# Patient Record
Sex: FEMALE | Race: OTHER | HISPANIC OR LATINO | Employment: PART TIME | URBAN - METROPOLITAN AREA
[De-identification: names, ages, dates, MRNs, and addresses within clinical notes are randomized per-mention and may not be internally consistent; named-entity substitution may affect disease eponyms.]

---

## 2017-10-05 ENCOUNTER — CONVERSION ENCOUNTER (OUTPATIENT)
Dept: MAMMOGRAPHY | Facility: CLINIC | Age: 42
End: 2017-10-05

## 2018-06-02 LAB
ABSOL LYMPHOCYTES (HISTORICAL): 1.4 K/UL (ref 0.5–4)
BASOPHILS # BLD AUTO: 0 K/UL (ref 0–0.1)
BASOPHILS # BLD AUTO: 1 % (ref 0–1)
DEPRECATED RDW RBC AUTO: 17.2 %
EOSINOPHIL # BLD AUTO: 0.1 K/UL (ref 0–0.4)
EOSINOPHIL NFR BLD AUTO: 1 % (ref 0–6)
HCT VFR BLD AUTO: 41 % (ref 36–46)
HGB BLD-MCNC: 13.8 G/DL (ref 12–16)
LYMPHOCYTES NFR BLD AUTO: 19 % (ref 25–45)
MCH RBC QN AUTO: 29.6 PG (ref 26–34)
MCHC RBC AUTO-ENTMCNC: 33.7 % (ref 31–36)
MCV RBC AUTO: 88 FL (ref 80–100)
MONOCYTES # BLD AUTO: 0.4 K/UL (ref 0.2–0.9)
MONOCYTES NFR BLD AUTO: 6 % (ref 1–10)
NEUTROPHILS ABS COUNT (HISTORICAL): 5.6 K/UL (ref 1.8–7.8)
NEUTS SEG NFR BLD AUTO: 73 % (ref 45–65)
PLATELET # BLD AUTO: 247 K/MCL (ref 150–450)
RBC # BLD AUTO: 4.67 M/MCL (ref 4–5.2)
WBC # BLD AUTO: 7.6 K/MCL (ref 4.5–11)

## 2018-07-18 ENCOUNTER — TELEPHONE (OUTPATIENT)
Dept: FAMILY MEDICINE CLINIC | Facility: CLINIC | Age: 43
End: 2018-07-18

## 2018-08-09 ENCOUNTER — ANNUAL EXAM (OUTPATIENT)
Dept: OBGYN CLINIC | Facility: CLINIC | Age: 43
End: 2018-08-09
Payer: COMMERCIAL

## 2018-08-09 VITALS — SYSTOLIC BLOOD PRESSURE: 120 MMHG | DIASTOLIC BLOOD PRESSURE: 80 MMHG | WEIGHT: 148 LBS

## 2018-08-09 DIAGNOSIS — Z01.419 WOMEN'S ANNUAL ROUTINE GYNECOLOGICAL EXAMINATION: Primary | ICD-10-CM

## 2018-08-09 DIAGNOSIS — Z12.4 SCREENING FOR MALIGNANT NEOPLASM OF CERVIX: ICD-10-CM

## 2018-08-09 PROCEDURE — 99386 PREV VISIT NEW AGE 40-64: CPT | Performed by: NURSE PRACTITIONER

## 2018-08-09 RX ORDER — FERROUS FUMARATE 324(106)MG
324 TABLET ORAL
COMMUNITY
Start: 2018-03-05 | End: 2019-05-10 | Stop reason: SDUPTHER

## 2018-08-09 RX ORDER — RANITIDINE 150 MG/1
150 TABLET ORAL EVERY 12 HOURS
COMMUNITY
Start: 2018-03-13 | End: 2019-05-10 | Stop reason: SDUPTHER

## 2018-08-09 RX ORDER — ZOLPIDEM TARTRATE 10 MG/1
10 TABLET ORAL EVERY 24 HOURS
COMMUNITY
End: 2021-05-04 | Stop reason: ALTCHOICE

## 2018-08-09 NOTE — PROGRESS NOTES
ANNUAL GYNECOLOGICAL EXAMINATION    Kenyatta Brice is a 44 y o  female who presents today for annual GYN exam   Her last pap smear was performed 10/17/17 and result was NILM with neg HPV  She reports no history of abnormal pap smears in her past  Her last mammogram was performed 10/5/17 and result was WNL  She reports menses as regular  Patient's last menstrual period was 2018 (exact date)  Her contraceptive method is BTL  Her general medical history has been reviewed and she reports it as follows:    Past Medical History:   Diagnosis Date    Depression     well-managed with Zoloft     Past Surgical History:   Procedure Laterality Date    TUBAL LIGATION       OB History      Para Term  AB Living    3 3 3 0   3    SAB TAB Ectopic Multiple Live Births                     Social History   Substance Use Topics    Smoking status: Never Smoker    Smokeless tobacco: Never Used    Alcohol use No     Cancer-related family history is negative for Cancer and Breast cancer  Current Outpatient Prescriptions:     Ferrous Fumarate 324 (106 Fe) MG TABS, Take 324 mg by mouth every 12 hours for 3 doses only each week, Disp: , Rfl:     ranitidine (ZANTAC) 150 mg tablet, Take 150 mg by mouth Every 12 hours, Disp: , Rfl:     sertraline (ZOLOFT) 50 mg tablet, Take 50 mg by mouth every 24 hours, Disp: , Rfl:     zolpidem (AMBIEN) 10 mg tablet, Apply 10 mg to the mouth or throat every 24 hours, Disp: , Rfl:     Review of Systems:  Review of Systems   Constitutional: Negative  Gastrointestinal: Negative  Genitourinary: Negative for difficulty urinating, menstrual problem, pelvic pain and vaginal discharge  Skin: Negative  Physical Exam:  /80 (Patient Position: Sitting)   Wt 67 1 kg (148 lb)   LMP 2018 (Exact Date)   Physical Exam   Constitutional: She is oriented to person, place, and time  She appears well-developed and well-nourished     Genitourinary: Vagina normal and uterus normal  There is no lesion on the right labia  There is no lesion on the left labia  Vagina exhibits no lesion and no rugosity  No vaginal discharge found  Right adnexum does not display mass and does not display tenderness  Left adnexum does not display mass and does not display tenderness  Cervix does not exhibit motion tenderness, lesion or pinkness  Uterus is not tender  Neck: Neck supple  No thyromegaly present  Cardiovascular: Normal rate and regular rhythm  Pulmonary/Chest: Effort normal and breath sounds normal  Right breast exhibits no mass, no nipple discharge, no skin change and no tenderness  Left breast exhibits no mass, no nipple discharge, no skin change and no tenderness  Abdominal: Soft  Bowel sounds are normal    Neurological: She is alert and oriented to person, place, and time  Skin: Skin is warm and dry  Assessment:   1  Normal well-woman GYN exam     Plan:   1  Pap smear is current and not indicated at this time  2  Imaging ordered: bilateral screening mammogram to be done after 10/5/18  3  Return to office 1 year

## 2018-09-04 ENCOUNTER — HOSPITAL ENCOUNTER (EMERGENCY)
Facility: HOSPITAL | Age: 43
Discharge: HOME | End: 2018-09-04
Attending: EMERGENCY MEDICINE | Admitting: EMERGENCY MEDICINE
Payer: COMMERCIAL

## 2018-09-04 VITALS
DIASTOLIC BLOOD PRESSURE: 69 MMHG | SYSTOLIC BLOOD PRESSURE: 122 MMHG | TEMPERATURE: 97.6 F | HEART RATE: 82 BPM | RESPIRATION RATE: 16 BRPM | OXYGEN SATURATION: 99 %

## 2018-09-04 DIAGNOSIS — B00.9 HERPES INFECTION: Primary | ICD-10-CM

## 2018-09-04 PROCEDURE — 99282 EMERGENCY DEPT VISIT SF MDM: CPT

## 2018-09-04 RX ORDER — ACYCLOVIR 800 MG/1
800 TABLET ORAL
Qty: 35 TABLET | Refills: 0 | Status: SHIPPED | OUTPATIENT
Start: 2018-09-04 | End: 2020-01-27 | Stop reason: ALTCHOICE

## 2018-09-04 RX ORDER — ACYCLOVIR 50 MG/G
OINTMENT TOPICAL
Qty: 15 G | Refills: 0 | Status: SHIPPED | OUTPATIENT
Start: 2018-09-04 | End: 2019-05-10

## 2018-09-04 NOTE — ED PROVIDER NOTES
History  Chief Complaint   Patient presents with    Rash     tiburcio has rash on the L side of face X4 days  78-year-old female presents for evaluation of a rash over the left side of her cheek that started yesterday  Patient reports that it is itchy and painful to touch  Reports that she noticed it spread  States that she had this same exact rash 10 years ago and was told it was shingles  Patient denies any fever, chills, new stress like conditions  Reports that she has not taken anything for it  Prior to Admission Medications   Prescriptions Last Dose Informant Patient Reported? Taking? Ferrous Fumarate 324 (106 Fe) MG TABS   Yes No   Sig: Take 324 mg by mouth every 12 hours for 3 doses only each week   ranitidine (ZANTAC) 150 mg tablet   Yes No   Sig: Take 150 mg by mouth Every 12 hours   sertraline (ZOLOFT) 50 mg tablet   Yes No   Sig: Take 50 mg by mouth every 24 hours   zolpidem (AMBIEN) 10 mg tablet   Yes No   Sig: Apply 10 mg to the mouth or throat every 24 hours      Facility-Administered Medications: None       Past Medical History:   Diagnosis Date    Depression 2017    well-managed with Zoloft       Past Surgical History:   Procedure Laterality Date    TUBAL LIGATION  2009       Family History   Problem Relation Age of Onset    Cancer Neg Hx     Breast cancer Neg Hx      I have reviewed and agree with the history as documented  Social History   Substance Use Topics    Smoking status: Never Smoker    Smokeless tobacco: Never Used    Alcohol use No        Review of Systems   Constitutional: Negative for chills and fever  HENT: Negative for ear discharge, ear pain and facial swelling  Eyes: Negative for pain and redness  Gastrointestinal: Negative for nausea and vomiting  Musculoskeletal: Negative for myalgias, neck pain and neck stiffness  Skin: Positive for rash  Neurological: Negative for weakness and numbness         Physical Exam  Physical Exam Constitutional: She appears well-developed and well-nourished  No distress  HENT:   Head:       Cardiovascular: Normal rate and normal heart sounds  Pulmonary/Chest: Effort normal and breath sounds normal    Skin: Skin is warm  Capillary refill takes less than 2 seconds  Rash noted  She is not diaphoretic  Vitals reviewed  Vital Signs  ED Triage Vitals   Temperature Pulse Respirations Blood Pressure SpO2   09/04/18 1654 09/04/18 1655 09/04/18 1655 09/04/18 1655 09/04/18 1655   97 6 °F (36 4 °C) 82 16 122/69 99 %      Temp Source Heart Rate Source Patient Position - Orthostatic VS BP Location FiO2 (%)   09/04/18 1654 09/04/18 1655 09/04/18 1655 09/04/18 1655 --   Temporal Monitor Sitting Right arm       Pain Score       09/04/18 1655       6           Vitals:    09/04/18 1655   BP: 122/69   Pulse: 82   Patient Position - Orthostatic VS: Sitting       Visual Acuity      ED Medications  Medications - No data to display    Diagnostic Studies  Results Reviewed     None                 No orders to display              Procedures  Procedures       Phone Contacts  ED Phone Contact    ED Course                               MDM  Number of Diagnoses or Management Options  Herpes infection:   Diagnosis management comments: A 45-year-old female presents for evaluation of a rash over the left side of her face that started yesterday  Reports that it is itchy  Has not taken anything for it  Patient reports he had the same rash 10 years ago was [place] antiviral in a different country  Will treat this as herpes infection shingles vs HSV  Advised patient to follow up with family care provider in 1 week for recheck of symptoms      CritCare Time    Disposition  Final diagnoses:   Herpes infection     Time reflects when diagnosis was documented in both MDM as applicable and the Disposition within this note     Time User Action Codes Description Comment    9/4/2018  5:17 PM Rosmery Ryder Add [B00 9] Herpes infection ED Disposition     ED Disposition Condition Comment    Discharge  Berhane Galan discharge to home/self care  Condition at discharge: Stable        Follow-up Information     Follow up With Specialties Details Why Contact Rima Condon MD Family Medicine Schedule an appointment as soon as possible for a visit in 1 week Follow up for re-check of symptoms [address]  [address] [facility]  [address]             Discharge Medication List as of 9/4/2018  5:21 PM      START taking these medications    Details   acyclovir (ZOVIRAX) 5 % ointment Apply topically 6 (six) times a day for 7 days, Starting Tue 9/4/2018, Until Tue 9/11/2018, Print      acyclovir (ZOVIRAX) 800 mg tablet Take 1 tablet (800 mg total) by mouth 5 (five) times a day for 7 days, Starting Tue 9/4/2018, Until Tue 9/11/2018, Print         CONTINUE these medications which have NOT CHANGED    Details   Ferrous Fumarate 324 (106 Fe) MG TABS Take 324 mg by mouth every 12 hours for 3 doses only each week, Starting Mon 3/5/2018, Historical Med      ranitidine (ZANTAC) 150 mg tablet Take 150 mg by mouth Every 12 hours, Starting Tue 3/13/2018, Historical Med      sertraline (ZOLOFT) 50 mg tablet Take 50 mg by mouth every 24 hours, Historical Med      zolpidem (AMBIEN) 10 mg tablet Apply 10 mg to the mouth or throat every 24 hours, Historical Med           No discharge procedures on file      ED Provider  Electronically Signed by           Toan Baig PA-C  09/04/18 9531

## 2018-09-04 NOTE — DISCHARGE INSTRUCTIONS
Culebrilla   LO QUE NECESITA SABER:   La culebrilla es oli infección dolorosa causada por el mismo virus que causa la varicela (virus varicela-zóster)  Después de contagiarse con varicela, el virus permanece en khanna cuerpo por varios años sin causar ningún síntoma  La culebrilla ocurre cuando el virus se activa nuevamente  Bautista Fried que se activa, el virus viaja por un nervio hasta khanna piel y provoca un sarpullido  INSTRUCCIONES SOBRE EL SYLWIA HOSPITALARIA:   Busque atención médica de inmediato si:   · La piel alrededor de las ampollas está adolorida, enrojecida y caliente o las ampollas drenan pus  · Usted tiene rigidez en el liliana o dificultad para moverlo  · Usted tiene dificultad para  ned brazos, piernas o darnell  · Usted sufre oli convulsión  · Usted tiene debilidad en un brazo o en oli pierna  · Usted se siente confundido o tiene dificultad para hablar  · Usted se siente mareado, tiene dolor de [place] severo o pérdida de audición o visión  Pregúntele a khanna Marvie Backers vitaminas y minerales son adecuados para usted  · Usted se siente débil o tiene dolor de [place]  · Usted tiene tos, escalofríos o fiebre  · Usted tiene dolor abdominal o náuseas o está vomitando  · El sarpullido le causa más picazón o dolor  · El sarpullido se propaga a otras partes de khanna cuerpo  · Khanna dolor empeora y no desaparece aún después de kartik medicamento  · Usted tiene preguntas o inquietudes acerca de khanna condición o cuidado  Medicamentos:   · Medicamentos antivirales  ayudan a reducir los síntomas y el tiempo de recuperación  También podrían reducir khanna riesgo de dolor de [place]  Para prevenir el dolor del nervio, deberá comenzar a kartik huang medicamento dentro de los primeros yuan días del inicio de los síntomas      · Los analgésicos  pueden ser recetados o sugeridos por khanna médico  Dependiendo de la severidad de khanna dolor, usted podría necesitar medicamentos antiinflamatorios para el dolor, acetaminofén u opiáceos  No espere a que khanna dolor sea severo para kartik más medicamentos para el dolor  · Los anestésicos tópicos  se usan para entumecer la piel y reducir el dolor  Son disponibles en forma de crema, gel, aerosol o un parche  · Los anticonvulsivos  reducen el dolor de nervio y podrían facilitar khanna habilidad para dormir  · Antidepresivos  se usan para reducir el dolor de nervio  · [place] ned medicamentos yordy se le haya indicado  Consulte con khanna médico si usted amber que khanna medicamento no le está ayudando o si presenta efectos secundarios  Infórmele si es alérgico a cualquier medicamento  Mantenga oli lista actualizada de los [place], las vitaminas y los productos herbales que margret  Incluya los siguientes datos de los medicamentos: cantidad, frecuencia y motivo de administración  Traiga con usted la lista o los envases de la píldoras a ned citas de seguimiento  Lleve la lista de los medicamentos con usted en veronica de oli emergencia  Acuda a ned consultas de control con khanna médico según le indicaron  Anote ned preguntas para que se acuerde de hacerlas blake ned visitas  Cuidados personales:  Mantenga el sarpullido limpio y [address] sarpullido con un vendaje o ropa  No utilice vendajes que se pegan a la piel  La parte pegajosa podría irritar la piel y prolongar el sarpullido  Prevenga la propagación de la culebrilla:  Es posible transmitir el virus a oli persona que nunca ha tenido varicela  Oli vez expuesto al virus, esta persona puede contraer la varicela, yelena no culebrilla  Es posible transmitir el virus a otras personas mientras tiene el sarpullido  El virus se transmite por contacto directo con el líquido de las [place]  Por lo general, no es posible transmitir el virus oli vez que se sequen las ampollas  Prevenga la culebrilla u otro brote de culebrilla:  Es posible que se administre oli vacuna para facilitar la prevención de la culebrilla   Cally Perkins información acerca de esta vacuna  Para más información:   · Centers for Disease Control and Prevention  [address] , [address]  Phone: 0- 967 - [phone]  Phone: 1- 451 - 8437741  Web Address: [employer]  © 2017 [address] Information is for End User's use only and may not be sold, redistributed or otherwise used for commercial purposes  All illustrations and images included in CareNotes® are the copyrighted property of [employer] , Inc  or [employer] [place]  Esta información es sólo para uso en educación  Gomez intención no es darle un consejo médico sobre enfermedades o tratamientos  Colsulte con gomez Ozell Fabry farmacéutico antes de seguir cualquier régimen médico para saber si es seguro y efectivo para usted

## 2018-09-06 ENCOUNTER — OFFICE VISIT (OUTPATIENT)
Dept: FAMILY MEDICINE CLINIC | Facility: CLINIC | Age: 43
End: 2018-09-06
Payer: COMMERCIAL

## 2018-09-06 VITALS
SYSTOLIC BLOOD PRESSURE: 114 MMHG | DIASTOLIC BLOOD PRESSURE: 78 MMHG | RESPIRATION RATE: 16 BRPM | OXYGEN SATURATION: 99 % | TEMPERATURE: 96 F | WEIGHT: 144 LBS | HEART RATE: 80 BPM

## 2018-09-06 DIAGNOSIS — B02.9 HERPES ZOSTER: Primary | ICD-10-CM

## 2018-09-06 PROBLEM — D64.9 ANEMIA: Status: ACTIVE | Noted: 2018-02-01

## 2018-09-06 PROCEDURE — 1036F TOBACCO NON-USER: CPT | Performed by: FAMILY MEDICINE

## 2018-09-06 PROCEDURE — 99213 OFFICE O/P EST LOW 20 MIN: CPT | Performed by: FAMILY MEDICINE

## 2018-09-06 RX ORDER — ACYCLOVIR 50 MG/G
OINTMENT TOPICAL
Qty: 15 G | Refills: 1 | Status: SHIPPED | OUTPATIENT
Start: 2018-09-06 | End: 2020-03-24 | Stop reason: ALTCHOICE

## 2018-09-06 NOTE — PROGRESS NOTES
Assessment/Plan:    Herpes zoster  Patient has recurrent herpes zoster   - Started 15 years ago  - She went to ED and received Acyclovir PO  - She would like to Also have Acyclovir Topical   - Will Rx Zoster vaccine at this visit       Diagnoses and all orders for this visit:    Herpes zoster  -     acyclovir (ZOVIRAX) 5 % ointment; Apply topically every 3 (three) hours  -     Zoster Vaccine Recombinant IM          Subjective:      Patient ID: Erwin Vera is a 44 y o  female  This is a very pleasant 52-year-old female presents to the office with herpes zoster  Patient has lesion on left side of cheek  First outbreak was 15 years ago, never had another 1 since then  Patient went to the emergency department who prescribed her acyclovir p o  as well as acyclovir cream   Insurance did not cover the cream, and told her that she needs to come to her primary care physician and have them prescribed a cream   Patient has no other complaints at this time  The following portions of the patient's history were reviewed and updated as appropriate: allergies, current medications, past family history, past medical history, past social history, past surgical history and problem list     Review of Systems   Constitutional: Negative for activity change, appetite change, fatigue and fever  HENT: Negative for congestion, sore throat and trouble swallowing  Eyes: Negative for pain, discharge and visual disturbance  Respiratory: Negative for apnea, chest tightness and wheezing  Cardiovascular: Negative for chest pain, palpitations and leg swelling  Gastrointestinal: Negative for abdominal distention, abdominal pain, blood in stool, constipation and nausea  Endocrine: Negative for cold intolerance and heat intolerance  Genitourinary: Negative for difficulty urinating, dysuria, frequency, hematuria, pelvic pain and urgency  Musculoskeletal: Negative for arthralgias, back pain and gait problem     Skin: Positive for rash  Negative for color change and wound  Allergic/Immunologic: Negative for environmental allergies, food allergies and immunocompromised state  Neurological: Negative for dizziness, tremors, syncope and headaches  Hematological: Negative for adenopathy  Psychiatric/Behavioral: Negative for agitation and behavioral problems  Objective:      /78 (BP Location: Right arm, Patient Position: Sitting, Cuff Size: Standard)   Pulse 80   Temp (!) 96 °F (35 6 °C) (Temporal)   Resp 16   Wt 65 3 kg (144 lb)   SpO2 99%          Physical Exam   Constitutional: She is oriented to person, place, and time  She appears well-developed and well-nourished  No distress  HENT:   Head: Normocephalic and atraumatic  Right Ear: External ear normal    Left Ear: External ear normal    Nose: Nose normal    Mouth/Throat: Oropharynx is clear and moist    Eyes: Conjunctivae and EOM are normal  Pupils are equal, round, and reactive to light  Right eye exhibits no discharge  Left eye exhibits no discharge  Neck: Normal range of motion  Neck supple  No JVD present  No tracheal deviation present  No thyromegaly present  Cardiovascular: Normal rate, regular rhythm, normal heart sounds and intact distal pulses  Exam reveals no gallop and no friction rub  No murmur heard  Pulmonary/Chest: Effort normal and breath sounds normal  No respiratory distress  She has no wheezes  She exhibits no tenderness  Abdominal: Soft  Bowel sounds are normal  She exhibits no distension  There is no tenderness  There is no rebound  Musculoskeletal: Normal range of motion  She exhibits no edema or tenderness  Neurological: She is alert and oriented to person, place, and time  She has normal reflexes  Coordination normal    Skin: Skin is warm and dry  No rash noted  She is not diaphoretic  No erythema  Rash on left side of cheek , vesicular    Psychiatric: She has a normal mood and affect   Her behavior is normal  Thought content normal

## 2018-09-06 NOTE — ASSESSMENT & PLAN NOTE
Patient has recurrent herpes zoster   - Started 15 years ago  - She went to ED and received Acyclovir PO  - She would like to Also have Acyclovir Topical   - Will Rx Zoster vaccine at this visit

## 2018-09-07 ENCOUNTER — TELEPHONE (OUTPATIENT)
Dept: FAMILY MEDICINE CLINIC | Facility: CLINIC | Age: 43
End: 2018-09-07

## 2018-09-07 NOTE — TELEPHONE ENCOUNTER
Acyclovir ointment is not covered by [facility]  Requires prior auth  They only cover the oral med  I don't know of any alternatives off hand but if you have one in mind, I can check if it's covered

## 2018-09-28 ENCOUNTER — TRANSCRIBE ORDERS (OUTPATIENT)
Dept: ADMINISTRATIVE | Facility: HOSPITAL | Age: 43
End: 2018-09-28

## 2018-09-28 DIAGNOSIS — Z12.31 SCREENING MAMMOGRAM, ENCOUNTER FOR: Primary | ICD-10-CM

## 2018-10-01 ENCOUNTER — HOSPITAL ENCOUNTER (OUTPATIENT)
Dept: MAMMOGRAPHY | Facility: CLINIC | Age: 43
Discharge: HOME | End: 2018-10-01
Payer: COMMERCIAL

## 2018-10-01 DIAGNOSIS — Z12.31 SCREENING MAMMOGRAM, ENCOUNTER FOR: ICD-10-CM

## 2018-10-01 PROCEDURE — 77067 SCR MAMMO BI INCL CAD: CPT

## 2019-05-10 ENCOUNTER — OFFICE VISIT (OUTPATIENT)
Dept: FAMILY MEDICINE CLINIC | Facility: CLINIC | Age: 44
End: 2019-05-10

## 2019-05-10 VITALS
RESPIRATION RATE: 16 BRPM | WEIGHT: 154 LBS | BODY MASS INDEX: 25.66 KG/M2 | HEART RATE: 88 BPM | TEMPERATURE: 97.8 F | DIASTOLIC BLOOD PRESSURE: 70 MMHG | HEIGHT: 65 IN | SYSTOLIC BLOOD PRESSURE: 100 MMHG | OXYGEN SATURATION: 97 %

## 2019-05-10 DIAGNOSIS — E66.3 OVERWEIGHT (BMI 25.0-29.9): ICD-10-CM

## 2019-05-10 DIAGNOSIS — D50.8 IRON DEFICIENCY ANEMIA SECONDARY TO INADEQUATE DIETARY IRON INTAKE: ICD-10-CM

## 2019-05-10 DIAGNOSIS — K21.9 GASTROESOPHAGEAL REFLUX DISEASE, ESOPHAGITIS PRESENCE NOT SPECIFIED: ICD-10-CM

## 2019-05-10 DIAGNOSIS — Z13.220 SCREENING FOR CHOLESTEROL LEVEL: ICD-10-CM

## 2019-05-10 DIAGNOSIS — Z00.00 ANNUAL PHYSICAL EXAM: Primary | ICD-10-CM

## 2019-05-10 DIAGNOSIS — Z13.29 SCREENING FOR THYROID DISORDER: ICD-10-CM

## 2019-05-10 PROCEDURE — 99396 PREV VISIT EST AGE 40-64: CPT | Performed by: FAMILY MEDICINE

## 2019-05-10 PROCEDURE — 3725F SCREEN DEPRESSION PERFORMED: CPT | Performed by: FAMILY MEDICINE

## 2019-05-10 RX ORDER — FERROUS FUMARATE 324(106)MG
324 TABLET ORAL
Qty: 15 TABLET | Refills: 15 | Status: SHIPPED | OUTPATIENT
Start: 2019-05-10 | End: 2021-05-04 | Stop reason: ALTCHOICE

## 2019-05-10 RX ORDER — RANITIDINE 150 MG/1
150 TABLET ORAL 2 TIMES DAILY
Qty: 60 TABLET | Refills: 2 | Status: SHIPPED | OUTPATIENT
Start: 2019-05-10 | End: 2020-03-24 | Stop reason: ALTCHOICE

## 2019-05-20 ENCOUNTER — APPOINTMENT (OUTPATIENT)
Dept: LAB | Facility: HOSPITAL | Age: 44
End: 2019-05-20
Payer: COMMERCIAL

## 2019-05-20 DIAGNOSIS — Z13.220 SCREENING FOR CHOLESTEROL LEVEL: ICD-10-CM

## 2019-05-20 DIAGNOSIS — Z13.29 SCREENING FOR THYROID DISORDER: ICD-10-CM

## 2019-05-20 DIAGNOSIS — D50.8 IRON DEFICIENCY ANEMIA SECONDARY TO INADEQUATE DIETARY IRON INTAKE: ICD-10-CM

## 2019-05-20 LAB
BASOPHILS # BLD AUTO: 0 THOUSANDS/ΜL (ref 0–0.1)
BASOPHILS NFR BLD AUTO: 0 % (ref 0–1)
CHOLEST SERPL-MCNC: 155 MG/DL
EOSINOPHIL # BLD AUTO: 0.1 THOUSAND/ΜL (ref 0–0.4)
EOSINOPHIL NFR BLD AUTO: 2 % (ref 0–6)
ERYTHROCYTE [DISTWIDTH] IN BLOOD BY AUTOMATED COUNT: 13 %
HCT VFR BLD AUTO: 44.3 % (ref 36–46)
HDLC SERPL-MCNC: 69 MG/DL (ref 40–59)
HGB BLD-MCNC: 14.7 G/DL (ref 12–16)
LDLC SERPL CALC-MCNC: 69 MG/DL
LYMPHOCYTES # BLD AUTO: 1.6 THOUSANDS/ΜL (ref 0.5–4)
LYMPHOCYTES NFR BLD AUTO: 28 % (ref 25–45)
MCH RBC QN AUTO: 30.1 PG (ref 26–34)
MCHC RBC AUTO-ENTMCNC: 33.1 G/DL (ref 31–36)
MCV RBC AUTO: 91 FL (ref 80–100)
MONOCYTES # BLD AUTO: 0.4 THOUSAND/ΜL (ref 0.2–0.9)
MONOCYTES NFR BLD AUTO: 6 % (ref 1–10)
NEUTROPHILS # BLD AUTO: 3.7 THOUSANDS/ΜL (ref 1.8–7.8)
NEUTS SEG NFR BLD AUTO: 63 % (ref 45–65)
NONHDLC SERPL-MCNC: 86 MG/DL
PLATELET # BLD AUTO: 223 THOUSANDS/UL (ref 150–450)
PMV BLD AUTO: 9.3 FL (ref 8.9–12.7)
RBC # BLD AUTO: 4.89 MILLION/UL (ref 4–5.2)
TRIGL SERPL-MCNC: 83 MG/DL
TSH SERPL DL<=0.05 MIU/L-ACNC: 2.07 UIU/ML (ref 0.47–4.68)
WBC # BLD AUTO: 5.9 THOUSAND/UL (ref 4.5–11)

## 2019-05-20 PROCEDURE — 80061 LIPID PANEL: CPT

## 2019-05-20 PROCEDURE — 85025 COMPLETE CBC W/AUTO DIFF WBC: CPT

## 2019-05-20 PROCEDURE — 84443 ASSAY THYROID STIM HORMONE: CPT

## 2019-05-20 PROCEDURE — 36415 COLL VENOUS BLD VENIPUNCTURE: CPT

## 2020-01-27 ENCOUNTER — ANNUAL EXAM (OUTPATIENT)
Dept: OBGYN CLINIC | Facility: CLINIC | Age: 45
End: 2020-01-27

## 2020-01-27 VITALS
DIASTOLIC BLOOD PRESSURE: 76 MMHG | HEART RATE: 85 BPM | SYSTOLIC BLOOD PRESSURE: 111 MMHG | WEIGHT: 151.2 LBS | HEIGHT: 65 IN | BODY MASS INDEX: 25.19 KG/M2

## 2020-01-27 DIAGNOSIS — Z01.419 ENCOUNTER FOR ANNUAL ROUTINE GYNECOLOGICAL EXAMINATION: Primary | ICD-10-CM

## 2020-01-27 DIAGNOSIS — Z12.31 ENCOUNTER FOR SCREENING MAMMOGRAM FOR MALIGNANT NEOPLASM OF BREAST: ICD-10-CM

## 2020-01-27 DIAGNOSIS — Z12.4 SCREENING FOR CERVICAL CANCER: ICD-10-CM

## 2020-01-27 PROCEDURE — 87624 HPV HI-RISK TYP POOLED RSLT: CPT | Performed by: NURSE PRACTITIONER

## 2020-01-27 PROCEDURE — 99386 PREV VISIT NEW AGE 40-64: CPT | Performed by: NURSE PRACTITIONER

## 2020-01-27 PROCEDURE — G0145 SCR C/V CYTO,THINLAYER,RESCR: HCPCS | Performed by: NURSE PRACTITIONER

## 2020-01-27 PROCEDURE — 3725F SCREEN DEPRESSION PERFORMED: CPT | Performed by: NURSE PRACTITIONER

## 2020-01-27 PROCEDURE — 3008F BODY MASS INDEX DOCD: CPT | Performed by: FAMILY MEDICINE

## 2020-01-27 NOTE — LETTER
2020    To Leti GALINDO: 1975      This letter is to advise you that your recent PAP SMEAR results were reviewed by me and are NORMAL    We will see you in 1 year for your annual exam     Vega Carbajal

## 2020-01-27 NOTE — PATIENT INSTRUCTIONS
Make mammogram appointment  PAP results will be available in 2 weeks  Call with needs or concerns  Return in 1 year

## 2020-01-27 NOTE — PROGRESS NOTES
Annual Exam    Assessment   1  Encounter for annual routine gynecological examination     2  Screening for cervical cancer  Liquid-based pap, screening   3  Encounter for screening mammogram for malignant neoplasm of breast  Mammo screening bilateral w cad     well woman       Plan       Breast self exam technique reviewed and patient encouraged to perform self-exam monthly  Discussed healthy lifestyle modifications  Follow up in 1 year  Mammogram   Thin prep Pap smear  Patient Instructions   Make mammogram appointment  PAP results will be available in 2 weeks  Call with needs or concerns  Return in 1 year  Pt verbalized understanding of all discussed  Subjective      Silvina Anne is a 38 y o   female who presents for annual well woman exam  Periods are regular every 28-30 days, lasting 4 days  No intermenstrual bleeding, spotting, or discharge  Last WNL PAP ? 2017 WNL no record available  1 partner x 26 years, denies domestic violence     Current contraception: tubal ligation  History of abnormal Pap smear: no  Family history of uterine or ovarian cancer: no  Regular self breast exam: yes  History of abnormal mammogram: no  Family history of breast cancer: no  History of abnormal lipids: no  Menstrual History:  OB History        3    Para   3    Term   3       0    AB        Living   3       SAB        TAB        Ectopic        Multiple        Live Births                    Menarche age: 16  Patient's last menstrual period was 2020 (approximate)  The following portions of the patient's history were reviewed and updated as appropriate: allergies, current medications, past family history, past medical history, past social history, past surgical history and problem list     Review of Systems  Pertinent items are noted in HPI        Objective      /76   Pulse 85   Ht 5' 5" (1 651 m)   Wt 68 6 kg (151 lb 3 2 oz)   LMP 2020 (Approximate)   BMI 25 16 kg/m²     General: alert and oriented, in no acute distress, alert, appears stated age and cooperative   Heart: regular rate and rhythm, S1, S2 normal, no murmur, click, rub or gallop   Lungs: clear to auscultation bilaterally   Thyroid: Negative masses   Abdomen: soft, non-tender, without masses or organomegaly   Vulva: normal   Vagina: normal mucosa   Cervix: no bleeding following Pap, no cervical motion tenderness and no lesions   Uterus: normal size, non-tender, normal shape and consistency   Adnexa: normal adnexa   Urethra: normal   Breasts: NT,negative masses, discharge, or dimpling

## 2020-01-28 LAB
HPV HR 12 DNA CVX QL NAA+PROBE: NEGATIVE
HPV16 DNA CVX QL NAA+PROBE: NEGATIVE
HPV18 DNA CVX QL NAA+PROBE: NEGATIVE

## 2020-01-30 LAB
LAB AP GYN PRIMARY INTERPRETATION: NORMAL
Lab: NORMAL

## 2020-02-15 ENCOUNTER — HOSPITAL ENCOUNTER (EMERGENCY)
Facility: HOSPITAL | Age: 45
Discharge: HOME | End: 2020-02-15
Attending: EMERGENCY MEDICINE | Admitting: EMERGENCY MEDICINE
Payer: COMMERCIAL

## 2020-02-15 VITALS
RESPIRATION RATE: 18 BRPM | HEART RATE: 78 BPM | SYSTOLIC BLOOD PRESSURE: 130 MMHG | DIASTOLIC BLOOD PRESSURE: 74 MMHG | BODY MASS INDEX: 25.39 KG/M2 | WEIGHT: 152.56 LBS | TEMPERATURE: 97.4 F | OXYGEN SATURATION: 95 %

## 2020-02-15 DIAGNOSIS — B02.9 SHINGLES: Primary | ICD-10-CM

## 2020-02-15 PROCEDURE — 99283 EMERGENCY DEPT VISIT LOW MDM: CPT

## 2020-02-15 PROCEDURE — 99283 EMERGENCY DEPT VISIT LOW MDM: CPT | Performed by: EMERGENCY MEDICINE

## 2020-02-15 RX ORDER — VALACYCLOVIR HYDROCHLORIDE 1 G/1
1000 TABLET, FILM COATED ORAL 3 TIMES DAILY
Qty: 21 TABLET | Refills: 0 | Status: SHIPPED | OUTPATIENT
Start: 2020-02-15 | End: 2020-04-15 | Stop reason: SDUPTHER

## 2020-02-15 NOTE — ED PROVIDER NOTES
History  Chief Complaint   Patient presents with    Abdominal Pain     patient reports skin burning and upper abdomen pain, also reports upper back pain, denies N/V/D  Denies chest pain     Patient is a 38year old female with a past medical history significant for depression who presents with burning pain to a spot on the left upper abdomen that has been present for ~24 hours  Patient reports that the pain is a burning in sensation, no radiation, constant, moderate intensity, without any other associated symptoms  Did note a mid-back pain after twisting awkwardly, but states that it was bothersome, not true pain  Denies any abdominal pain, nausea, vomiting, diarrhea, fevers, chest pain, palpitations, numbness, tingling, loss of bowel or bladder control  Prior to Admission Medications   Prescriptions Last Dose Informant Patient Reported? Taking? Ferrous Fumarate 324 (106 Fe) MG TABS   No No   Sig: Take 324 mg by mouth every other day for 30 days   acyclovir (ZOVIRAX) 5 % ointment  Self No No   Sig: Apply topically every 3 (three) hours   Patient not taking: Reported on 5/10/2019   ranitidine (ZANTAC) 150 mg tablet   No No   Sig: Take 1 tablet (150 mg total) by mouth 2 (two) times a day for 90 days   sertraline (ZOLOFT) 50 mg tablet  Self Yes No   Sig: Take 50 mg by mouth every 24 hours   zolpidem (AMBIEN) 10 mg tablet  Self Yes No   Sig: Apply 10 mg to the mouth or throat every 24 hours      Facility-Administered Medications: None       Past Medical History:   Diagnosis Date    Depression 2017    well-managed with Zoloft       Past Surgical History:   Procedure Laterality Date    TUBAL LIGATION  2009       Family History   Problem Relation Age of Onset    Cancer Neg Hx     Breast cancer Neg Hx      I have reviewed and agree with the history as documented      Social History     Tobacco Use    Smoking status: Never Smoker    Smokeless tobacco: Never Used   Substance Use Topics    Alcohol use: No    Drug use: No       Review of Systems   Constitutional: Negative for chills and fever  HENT: Negative for congestion and rhinorrhea  Eyes: Negative for photophobia and visual disturbance  Respiratory: Negative for cough and shortness of breath  Cardiovascular: Negative for chest pain and palpitations  Gastrointestinal: Negative for abdominal pain, constipation, diarrhea, nausea and vomiting  Genitourinary: Negative for dysuria and hematuria  Musculoskeletal: Positive for back pain  Negative for neck pain  Skin: Positive for rash  Neurological: Negative for dizziness, light-headedness and headaches  Physical Exam  Physical Exam   Constitutional: She is oriented to person, place, and time  She appears well-developed and well-nourished  Non-toxic appearance  She does not appear ill  No distress  HENT:   Head: Normocephalic and atraumatic  Right Ear: External ear normal    Left Ear: External ear normal    Nose: Nose normal    Mouth/Throat: Oropharynx is clear and moist    Eyes: Pupils are equal, round, and reactive to light  Conjunctivae and EOM are normal    Neck: Normal range of motion  Neck supple  Cardiovascular: Normal rate, regular rhythm, normal heart sounds and intact distal pulses  Exam reveals no gallop and no friction rub  No murmur heard  Pulmonary/Chest: Effort normal and breath sounds normal  No respiratory distress  She has no wheezes  She has no rales  She exhibits no tenderness  Abdominal: Soft  Bowel sounds are normal  She exhibits no distension  There is no tenderness  Musculoskeletal: Normal range of motion  She exhibits no edema  Neurological: She is alert and oriented to person, place, and time  Skin: Skin is warm and dry  Rash noted  She is not diaphoretic  No erythema  No pallor  Please see media tab- potentially early onset shingles as one sided spanning one dermatome level    Psychiatric: She has a normal mood and affect   Her behavior is normal  Nursing note and vitals reviewed  Vital Signs  ED Triage Vitals [02/15/20 1406]   Temperature Pulse Respirations Blood Pressure SpO2   (!) 97 4 °F (36 3 °C) 78 18 130/74 95 %      Temp Source Heart Rate Source Patient Position - Orthostatic VS BP Location FiO2 (%)   Temporal Monitor Sitting Right arm --      Pain Score       9           Vitals:    02/15/20 1406   BP: 130/74   Pulse: 78   Patient Position - Orthostatic VS: Sitting         Visual Acuity      ED Medications  Medications - No data to display    Diagnostic Studies  Results Reviewed     None                 No orders to display              Procedures  Procedures         ED Course                               MDM  Number of Diagnoses or Management Options  Shingles:   Diagnosis management comments: Assessment and Plan:   Erythematous area spanning one dermatome, burning pain likely early onset shingles  Will treat with valacyclovir  Discussed follow up with PCP and return precautions which patient verbalized understanding of  Disposition  Final diagnoses:   Shingles     Time reflects when diagnosis was documented in both MDM as applicable and the Disposition within this note     Time User Action Codes Description Comment    2/15/2020  3:48 PM Oralia Homerbree, [id number] [place] [address] [[address] [id number]] Shingles       ED Disposition     ED Disposition Condition Date/Time Comment    Discharge Stable Sat Feb 15, 2020  3:48 PM Tillman Mcburney discharge to home/self care              Follow-up Information     Follow up With Specialties Details Why Contact Info Additional [address] [facility] Emergency Department Emergency Medicine Go to  As needed, If symptoms worsen, for re-evaluation [place] [id number]  [phone] [place] ED, [address] [facility] , [place], [place], [address] [facility] Family Medicine Schedule an appointment as soon as possible for a visit in 3 days for re-evaluation, and to establish care with primary care provider [address], [address] [facility], [address], [address] [place], [place], [address]          Discharge Medication List as of 2/15/2020  3:50 PM      START taking these medications    Details   valACYclovir (VALTREX) 1,000 mg tablet Take 1 tablet (1,000 mg total) by mouth 3 (three) times a day for 7 days, Starting Sat 2/15/2020, Until Sat 2/22/2020, Normal         CONTINUE these medications which have NOT CHANGED    Details   acyclovir (ZOVIRAX) 5 % ointment Apply topically every 3 (three) hours, Starting Thu 9/6/2018, Normal      Ferrous Fumarate 324 (106 Fe) MG TABS Take 324 mg by mouth every other day for 30 days, Starting Fri 5/10/2019, Until Sun 6/9/2019, Normal      ranitidine (ZANTAC) 150 mg tablet Take 1 tablet (150 mg total) by mouth 2 (two) times a day for 90 days, Starting Fri 5/10/2019, Until Thu 8/8/2019, Normal      sertraline (ZOLOFT) 50 mg tablet Take 50 mg by mouth every 24 hours, Historical Med      zolpidem (AMBIEN) 10 mg tablet Apply 10 mg to the mouth or throat every 24 hours, Historical Med           No discharge procedures on file      PDMP Review     None          ED Provider  Electronically Signed by           Sho Tapia DO  02/15/20 2050

## 2020-03-18 ENCOUNTER — HOSPITAL ENCOUNTER (OUTPATIENT)
Dept: MAMMOGRAPHY | Facility: CLINIC | Age: 45
Discharge: HOME | End: 2020-03-18
Payer: COMMERCIAL

## 2020-03-18 VITALS — HEIGHT: 65 IN | WEIGHT: 149 LBS | BODY MASS INDEX: 24.83 KG/M2

## 2020-03-18 DIAGNOSIS — Z12.31 ENCOUNTER FOR SCREENING MAMMOGRAM FOR MALIGNANT NEOPLASM OF BREAST: ICD-10-CM

## 2020-03-18 PROCEDURE — 77063 BREAST TOMOSYNTHESIS BI: CPT

## 2020-03-18 PROCEDURE — 77067 SCR MAMMO BI INCL CAD: CPT

## 2020-03-24 ENCOUNTER — TELEMEDICINE (OUTPATIENT)
Dept: FAMILY MEDICINE CLINIC | Facility: CLINIC | Age: 45
End: 2020-03-24

## 2020-03-24 DIAGNOSIS — K21.9 GASTROESOPHAGEAL REFLUX DISEASE WITHOUT ESOPHAGITIS: Primary | ICD-10-CM

## 2020-03-24 PROBLEM — Z00.00 ANNUAL PHYSICAL EXAM: Status: RESOLVED | Noted: 2019-05-10 | Resolved: 2020-03-24

## 2020-03-24 PROBLEM — Z13.29 SCREENING FOR THYROID DISORDER: Status: RESOLVED | Noted: 2019-05-10 | Resolved: 2020-03-24

## 2020-03-24 PROBLEM — Z13.220 SCREENING FOR CHOLESTEROL LEVEL: Status: RESOLVED | Noted: 2019-05-10 | Resolved: 2020-03-24

## 2020-03-24 PROCEDURE — T1015 CLINIC SERVICE: HCPCS | Performed by: FAMILY MEDICINE

## 2020-03-24 PROCEDURE — G2012 BRIEF CHECK IN BY MD/QHP: HCPCS | Performed by: FAMILY MEDICINE

## 2020-03-24 RX ORDER — OMEPRAZOLE 20 MG/1
20 CAPSULE, DELAYED RELEASE ORAL
Qty: 90 CAPSULE | Refills: 3 | Status: SHIPPED | OUTPATIENT
Start: 2020-03-24 | End: 2021-05-04 | Stop reason: SDUPTHER

## 2020-03-24 NOTE — PROGRESS NOTES
Virtual Regular Visit    Problem List Items Addressed This Visit        Digestive    Gastroesophageal reflux disease - Primary     Intermittent, stable ongoing symptoms   Start Omeprazole 20 mg daily, Rx sent to pharmacy   Avoid acidic foods such as tomatoes, spicy food, chocolate, alcohol, caffeine  Instructed to elevate head of bed to at least 6 inches before going to sleep and avoid late dinners  Patient agreeable to plan   Will follow up in 3 months          Relevant Medications    omeprazole (PriLOSEC) 20 mg delayed release capsule               Reason for visit is follow up of GERD  Encounter provider Carey Nava MD    Provider located at [address] [place] [id number] [phone]      Recent Visits  No visits were found meeting these conditions  Showing recent visits within past 7 days and meeting all other requirements     Future Appointments  No visits were found meeting these conditions  Showing future appointments within next 150 days and meeting all other requirements        After connecting through [email or web address], the patient was identified by name and date of birth  Eitan Case was informed that this is a telemedicine visit and that the visit is being conducted through telephone which may not be secure and therefore, might not be HIPAA-compliant  My office door was closed  No one else was in the room  She acknowledged consent and understanding of privacy and security of the video platform  The patient has agreed to participate and understands they can discontinue the visit at any time  Subjective  Eitan Case is a 38 y o  female  who would like to discuss her GERD symptoms  History of intermittent heart burn, states about three times/week  Reports sometimes she feels acid reflux sx middle day and evening  Patient avoids tomatoes and chocolate, which she enjoys very much   She states was taking Zantac 150 mg BID in the past  Denies nausea, vomiting, abdominal pain  Past Medical History:   Diagnosis Date    Depression 2017    well-managed with Zoloft       Past Surgical History:   Procedure Laterality Date    TUBAL LIGATION  2009       Current Outpatient Medications   Medication Sig Dispense Refill    Ferrous Fumarate 324 (106 Fe) MG TABS Take 324 mg by mouth every other day for 30 days 15 tablet 15    omeprazole (PriLOSEC) 20 mg delayed release capsule Take 1 capsule (20 mg total) by mouth daily before breakfast 90 capsule 3    sertraline (ZOLOFT) 50 mg tablet Take 50 mg by mouth every 24 hours      valACYclovir (VALTREX) 1,000 mg tablet Take 1 tablet (1,000 mg total) by mouth 3 (three) times a day for 7 days 21 tablet 0    zolpidem (AMBIEN) 10 mg tablet Apply 10 mg to the mouth or throat every 24 hours       No current facility-administered medications for this visit  No Known Allergies    Review of Systems   Constitutional: Negative for chills, fatigue and fever  HENT: Negative for sore throat and trouble swallowing  Eyes: Negative for visual disturbance  Respiratory: Negative for cough and shortness of breath  Cardiovascular: Negative for chest pain, palpitations and leg swelling  Gastrointestinal: Negative for abdominal distention, abdominal pain, blood in stool, constipation, diarrhea, nausea and vomiting  Intermittent heart burn    Genitourinary: Negative for dysuria and hematuria  Musculoskeletal: Negative for gait problem  Skin: Negative for rash  Neurological: Negative for dizziness, weakness and headaches  Psychiatric/Behavioral: Negative for agitation  I spent 20 minutes with the patient during this visit

## 2020-03-24 NOTE — ASSESSMENT & PLAN NOTE
Intermittent, stable ongoing symptoms   Start Omeprazole 20 mg daily, Rx sent to pharmacy   Avoid acidic foods such as tomatoes, spicy food, chocolate, alcohol, caffeine     Instructed to elevate head of bed to at least 6 inches before going to sleep and avoid late dinners  Patient agreeable to plan   Will follow up in 3 months

## 2020-04-15 ENCOUNTER — TELEMEDICINE (OUTPATIENT)
Dept: FAMILY MEDICINE CLINIC | Facility: CLINIC | Age: 45
End: 2020-04-15

## 2020-04-15 DIAGNOSIS — B02.9 SHINGLES: ICD-10-CM

## 2020-04-15 DIAGNOSIS — B02.8 HERPES ZOSTER WITH COMPLICATION: Primary | ICD-10-CM

## 2020-04-15 PROCEDURE — T1015 CLINIC SERVICE: HCPCS | Performed by: FAMILY MEDICINE

## 2020-04-15 PROCEDURE — G2012 BRIEF CHECK IN BY MD/QHP: HCPCS | Performed by: FAMILY MEDICINE

## 2020-04-15 RX ORDER — VALACYCLOVIR HYDROCHLORIDE 1 G/1
1000 TABLET, FILM COATED ORAL 3 TIMES DAILY
Qty: 21 TABLET | Refills: 0 | Status: SHIPPED | OUTPATIENT
Start: 2020-04-15 | End: 2020-04-22

## 2021-05-04 ENCOUNTER — OFFICE VISIT (OUTPATIENT)
Dept: FAMILY MEDICINE CLINIC | Facility: CLINIC | Age: 46
End: 2021-05-04

## 2021-05-04 VITALS
SYSTOLIC BLOOD PRESSURE: 118 MMHG | RESPIRATION RATE: 18 BRPM | TEMPERATURE: 96.7 F | WEIGHT: 147.6 LBS | HEART RATE: 97 BPM | HEIGHT: 65 IN | DIASTOLIC BLOOD PRESSURE: 76 MMHG | OXYGEN SATURATION: 99 % | BODY MASS INDEX: 24.59 KG/M2

## 2021-05-04 DIAGNOSIS — Z00.00 ENCOUNTER FOR ANNUAL PHYSICAL EXAM: Primary | ICD-10-CM

## 2021-05-04 DIAGNOSIS — K21.9 GASTROESOPHAGEAL REFLUX DISEASE WITHOUT ESOPHAGITIS: ICD-10-CM

## 2021-05-04 DIAGNOSIS — Z86.2 HISTORY OF ANEMIA: ICD-10-CM

## 2021-05-04 DIAGNOSIS — Z11.4 ENCOUNTER FOR SCREENING FOR HIV: ICD-10-CM

## 2021-05-04 PROCEDURE — 99396 PREV VISIT EST AGE 40-64: CPT | Performed by: FAMILY MEDICINE

## 2021-05-04 RX ORDER — OMEPRAZOLE 20 MG/1
20 CAPSULE, DELAYED RELEASE ORAL
Qty: 90 CAPSULE | Refills: 3 | Status: SHIPPED | OUTPATIENT
Start: 2021-05-04

## 2021-05-04 NOTE — PATIENT INSTRUCTIONS
Lifestyle Medicine Tip Sheet- [place]    1  Coma alimentos predominantemente menos procesados, yordy comida rápida, cenas de televisión y tocino  2  Coma cerca de la naturaleza (mercados de agricultores, productos frescos o congelados)    3  Coma oli dieta predominantemente basada en plantas  a  Verdes frondosos oscuros naina   b  Frutas y vegetales  c   Granos integrales: aime integral, apenas, bayas de aime, quinua, olinda cortada en primo, arroz integral, pasta integral   d  Legumbres: frijoles, frijoles pintos, frijoles blancos, frijoles negros, garbanzos (garbanzos), frijoles lima (maduros, secos), arvejas, lentejas y edamame (frijoles de soya verdes)      4  Al menos la mitad del plato debe contener frutas o verduras  5  El líquido debe ser predominantemente agua (limite el refresco y [place])    6  Hoda el tamaño de la porción  7  ¿Qué alimentos delroy evitar o limitar? - Grasas: Específicamente saturadas y grasas trans  Se encuentran en margarinas, muchas comidas rápidas y algunos productos horneados comprados en la jf  Las grasas saturadas y grasas trans pueden elevar khanna nivel de colesterol y khanna probabilidad de contraer enfermedades cardíacas  - Cuando cocine, es mejor no usar aceites, yelena si es necesario, trate de limitar la cantidad de aceite utilizado, ya que el aceite contiene muchas calorías por volumen y es muy poco saludable cuando se calienta blake la cocción   - Azúcar: limite o evite el azúcar, los dulces y los granos refinados  Los granos refinados se encuentran en el pan soriano, el arroz soriano, la mayoría de las formas de pasta y la mayoría de los alimentos "aperitivos" envasados  - Intente no cocinar con parth y evite agregar parth adicional a ned comidas  - Chance Ingram: los estudios abreu demostrado que comer mucha mona Estrada riesgo de ciertos problemas de [place], yordy enfermedades cardíacas y cáncer  8  7-9 horas de sueño    9   Ejercicio diario mínimo de 30 minutos (caminando alrededor de la dilcia)    10  Socialización (amigos y familiares)    - Explora tu vecindario  Ve al parque, pasa tiempo en la biblioteca  Si está interesado, puede leer más sobre las opciones de alimentos saludables en los siguientes sitios web:  a  NutritionScoupon  org  b  Home cooking recipes: https://www Winmedical/  c  http://kelsey info/  d  Familydoctor  org

## 2021-05-04 NOTE — ASSESSMENT & PLAN NOTE
Stable  Continue Omeprazole 20 mg daily, refilled   Avoid acidic foods such as tomatoes, spicy food, chocolate, alcohol, caffeine     Instructed to elevate head of bed to at least 6 inches before going to sleep and avoid late dinners

## 2021-05-04 NOTE — ASSESSMENT & PLAN NOTE
Not taking any Fe supplementation at the moment  Denies fatigue, shortness of breath, weakness, dizziness     Will order CBC to ensure Hgb is stable

## 2021-05-14 ENCOUNTER — IMMUNIZATIONS (OUTPATIENT)
Dept: FAMILY MEDICINE CLINIC | Facility: HOSPITAL | Age: 46
End: 2021-05-14

## 2021-05-14 DIAGNOSIS — Z23 ENCOUNTER FOR IMMUNIZATION: Primary | ICD-10-CM

## 2021-05-14 PROCEDURE — 91301 SARS-COV-2 / COVID-19 MRNA VACCINE (MODERNA) 100 MCG: CPT

## 2021-05-14 PROCEDURE — 0011A SARS-COV-2 / COVID-19 MRNA VACCINE (MODERNA) 100 MCG: CPT

## 2021-06-01 ENCOUNTER — ANNUAL EXAM (OUTPATIENT)
Dept: OBGYN CLINIC | Facility: CLINIC | Age: 46
End: 2021-06-01

## 2021-06-01 ENCOUNTER — LAB (OUTPATIENT)
Dept: LAB | Facility: HOSPITAL | Age: 46
End: 2021-06-01
Payer: COMMERCIAL

## 2021-06-01 VITALS
HEIGHT: 66 IN | WEIGHT: 148 LBS | BODY MASS INDEX: 23.78 KG/M2 | DIASTOLIC BLOOD PRESSURE: 75 MMHG | SYSTOLIC BLOOD PRESSURE: 109 MMHG | HEART RATE: 83 BPM

## 2021-06-01 DIAGNOSIS — Z12.31 ENCOUNTER FOR SCREENING MAMMOGRAM FOR MALIGNANT NEOPLASM OF BREAST: ICD-10-CM

## 2021-06-01 DIAGNOSIS — Z86.2 HISTORY OF ANEMIA: ICD-10-CM

## 2021-06-01 DIAGNOSIS — Z00.00 ENCOUNTER FOR ANNUAL PHYSICAL EXAM: ICD-10-CM

## 2021-06-01 DIAGNOSIS — Z11.4 ENCOUNTER FOR SCREENING FOR HIV: ICD-10-CM

## 2021-06-01 DIAGNOSIS — Z01.419 ENCOUNTER FOR ANNUAL ROUTINE GYNECOLOGICAL EXAMINATION: Primary | ICD-10-CM

## 2021-06-01 LAB
ALBUMIN SERPL BCP-MCNC: 4.5 G/DL (ref 3–5.2)
ALP SERPL-CCNC: 74 U/L (ref 43–122)
ALT SERPL W P-5'-P-CCNC: 14 U/L
ANION GAP SERPL CALCULATED.3IONS-SCNC: 10 MMOL/L (ref 5–14)
AST SERPL W P-5'-P-CCNC: 26 U/L (ref 14–36)
BASOPHILS # BLD AUTO: 0 THOUSANDS/ΜL (ref 0–0.1)
BASOPHILS NFR BLD AUTO: 0 % (ref 0–1)
BILIRUB SERPL-MCNC: 0.51 MG/DL
BUN SERPL-MCNC: 13 MG/DL (ref 5–25)
CALCIUM SERPL-MCNC: 10.1 MG/DL (ref 8.4–10.2)
CHLORIDE SERPL-SCNC: 104 MMOL/L (ref 97–108)
CO2 SERPL-SCNC: 25 MMOL/L (ref 22–30)
CREAT SERPL-MCNC: 0.57 MG/DL (ref 0.6–1.2)
EOSINOPHIL # BLD AUTO: 0.2 THOUSAND/ΜL (ref 0–0.4)
EOSINOPHIL NFR BLD AUTO: 3 % (ref 0–6)
ERYTHROCYTE [DISTWIDTH] IN BLOOD BY AUTOMATED COUNT: 13.8 %
GFR SERPL CREATININE-BSD FRML MDRD: 113 ML/MIN/1.73SQ M
GLUCOSE P FAST SERPL-MCNC: 77 MG/DL (ref 70–99)
HCT VFR BLD AUTO: 45.3 % (ref 36–46)
HGB BLD-MCNC: 14.9 G/DL (ref 12–16)
LYMPHOCYTES # BLD AUTO: 2 THOUSANDS/ΜL (ref 0.5–4)
LYMPHOCYTES NFR BLD AUTO: 26 % (ref 25–45)
MCH RBC QN AUTO: 30.1 PG (ref 26–34)
MCHC RBC AUTO-ENTMCNC: 33 G/DL (ref 31–36)
MCV RBC AUTO: 91 FL (ref 80–100)
MONOCYTES # BLD AUTO: 0.5 THOUSAND/ΜL (ref 0.2–0.9)
MONOCYTES NFR BLD AUTO: 6 % (ref 1–10)
NEUTROPHILS # BLD AUTO: 5 THOUSANDS/ΜL (ref 1.8–7.8)
NEUTS SEG NFR BLD AUTO: 65 % (ref 45–65)
PLATELET # BLD AUTO: 275 THOUSANDS/UL (ref 150–450)
PMV BLD AUTO: 9.2 FL (ref 8.9–12.7)
POTASSIUM SERPL-SCNC: 4.4 MMOL/L (ref 3.6–5)
PROT SERPL-MCNC: 7.5 G/DL (ref 5.9–8.4)
RBC # BLD AUTO: 4.95 MILLION/UL (ref 4–5.2)
SODIUM SERPL-SCNC: 139 MMOL/L (ref 137–147)
WBC # BLD AUTO: 7.7 THOUSAND/UL (ref 4.5–11)

## 2021-06-01 PROCEDURE — 85025 COMPLETE CBC W/AUTO DIFF WBC: CPT

## 2021-06-01 PROCEDURE — 99396 PREV VISIT EST AGE 40-64: CPT | Performed by: NURSE PRACTITIONER

## 2021-06-01 PROCEDURE — 36415 COLL VENOUS BLD VENIPUNCTURE: CPT

## 2021-06-01 PROCEDURE — 87389 HIV-1 AG W/HIV-1&-2 AB AG IA: CPT

## 2021-06-01 PROCEDURE — 80053 COMPREHEN METABOLIC PANEL: CPT

## 2021-06-01 NOTE — PATIENT INSTRUCTIONS
Make mammogram appointment  Call with needs or concerns  Return in 1 year    COVID-19 Instructions    If you are having any of the following:  Cough   Shortness of breath   Fever  If traveled within past 2 weeks internationally or to high risk [place] states  Or been in contact with someone that has     Please call either:   Your PCP office  [phone], option [phone]    They will screen you over the phone and direct you to the nearest appropriate testing location    DO NOT go to your PCP or OB office without calling first

## 2021-06-01 NOTE — PROGRESS NOTES
Annual Exam    Assessment   1  Encounter for annual routine gynecological examination     2  Encounter for screening mammogram for malignant neoplasm of breast  Mammo screening bilateral w 3d & cad     well woman       Plan       All questions answered  Breast self exam technique reviewed and patient encouraged to perform self-exam monthly  Contraception: tubal ligation  Discussed healthy lifestyle modifications  Mammogram      Patient Instructions   Make mammogram appointment  Call with needs or concerns  Return in 1 year  Pt verbalized understanding of all discussed  Subjective      Estella Myrick is a 41 y o  [id number] female who presents for annual well woman exam  Periods are regular every 28-30 days, lasting 4 days  No intermenstrual bleeding, spotting, or discharge  Last WNL PAP and negative HPV was 2020  1 partner x 28 years, no domestic violence    Current contraception: tubal ligation  History of abnormal Pap smear: no  Family history of uterine or ovarian cancer: no  Regular self breast exam: yes  History of abnormal mammogram: no  Family history of breast cancer: no  History of abnormal lipids: unknown  Menstrual History:  OB History        3    Para   3    Term   3       0    AB        Living   3       SAB        TAB        Ectopic        Multiple        Live Births                    Menarche age: 16  Patient's last menstrual period was 2021 (exact date)  The following portions of the patient's history were reviewed and updated as appropriate: allergies, current medications, past family history, past medical history, past social history, past surgical history and problem list     Review of Systems  Pertinent items are noted in HPI        Objective      /75 (BP Location: Left arm, Patient Position: Sitting, Cuff Size: Standard)   Pulse 83   Ht 5' 6" (1 676 m)   Wt 67 1 kg (148 lb)   LMP 2021 (Exact Date)   BMI 23 89 kg/m²     General: alert and oriented, in no acute distress, alert, appears stated age and cooperative   Heart: regular rate and rhythm, S1, S2 normal, no murmur, click, rub or gallop   Lungs: clear to auscultation bilaterally, WNL respiratory effort, negative cough or SOB   Thyroid: Negative masses   Abdomen: soft, non-tender, without masses or organomegaly   Vulva: normal   Vagina: normal mucosa   Cervix: no cervical motion tenderness and no lesions   Uterus: normal size, non-tender, normal shape and consistency   Adnexa: normal adnexa   Urethra: normal   Breasts: NT,negative masses, discharge, or dimpling

## 2021-06-01 NOTE — RESULT ENCOUNTER NOTE
Reviewed results, please call patient and let her know there is no anemia and electrolytes, liver and kidney function are unremarkable  Thank you!

## 2021-06-02 LAB — HIV 1+2 AB+HIV1 P24 AG SERPL QL IA: NORMAL

## 2021-06-09 ENCOUNTER — IMMUNIZATIONS (OUTPATIENT)
Dept: FAMILY MEDICINE CLINIC | Facility: HOSPITAL | Age: 46
End: 2021-06-09

## 2021-06-09 DIAGNOSIS — Z23 ENCOUNTER FOR IMMUNIZATION: Primary | ICD-10-CM

## 2021-06-09 PROCEDURE — 91301 SARS-COV-2 / COVID-19 MRNA VACCINE (MODERNA) 100 MCG: CPT

## 2021-06-09 PROCEDURE — 0012A SARS-COV-2 / COVID-19 MRNA VACCINE (MODERNA) 100 MCG: CPT

## 2021-08-18 ENCOUNTER — HOSPITAL ENCOUNTER (OUTPATIENT)
Dept: MAMMOGRAPHY | Facility: CLINIC | Age: 46
Discharge: HOME | End: 2021-08-18
Payer: COMMERCIAL

## 2021-08-18 DIAGNOSIS — Z12.31 ENCOUNTER FOR SCREENING MAMMOGRAM FOR MALIGNANT NEOPLASM OF BREAST: ICD-10-CM

## 2021-08-18 PROCEDURE — 77063 BREAST TOMOSYNTHESIS BI: CPT

## 2021-08-18 PROCEDURE — 77067 SCR MAMMO BI INCL CAD: CPT

## 2021-09-29 ENCOUNTER — OFFICE VISIT (OUTPATIENT)
Dept: FAMILY MEDICINE CLINIC | Facility: CLINIC | Age: 46
End: 2021-09-29

## 2021-09-29 ENCOUNTER — IMMUNIZATIONS (OUTPATIENT)
Dept: FAMILY MEDICINE CLINIC | Facility: CLINIC | Age: 46
End: 2021-09-29

## 2021-09-29 VITALS
DIASTOLIC BLOOD PRESSURE: 80 MMHG | TEMPERATURE: 98 F | WEIGHT: 150 LBS | BODY MASS INDEX: 24.11 KG/M2 | HEART RATE: 91 BPM | OXYGEN SATURATION: 99 % | SYSTOLIC BLOOD PRESSURE: 100 MMHG | HEIGHT: 66 IN | RESPIRATION RATE: 16 BRPM

## 2021-09-29 DIAGNOSIS — R21 RASH: Primary | ICD-10-CM

## 2021-09-29 DIAGNOSIS — Z86.19 HISTORY OF SHINGLES: ICD-10-CM

## 2021-09-29 DIAGNOSIS — Z23 NEED FOR VACCINATION: Primary | ICD-10-CM

## 2021-09-29 PROCEDURE — 90471 IMMUNIZATION ADMIN: CPT | Performed by: FAMILY MEDICINE

## 2021-09-29 PROCEDURE — 90686 IIV4 VACC NO PRSV 0.5 ML IM: CPT | Performed by: FAMILY MEDICINE

## 2021-09-29 PROCEDURE — 99214 OFFICE O/P EST MOD 30 MIN: CPT | Performed by: PHYSICIAN ASSISTANT

## 2021-09-29 RX ORDER — CLOTRIMAZOLE 1 %
CREAM (GRAM) TOPICAL 2 TIMES DAILY
Qty: 30 G | Refills: 0 | Status: SHIPPED | OUTPATIENT
Start: 2021-09-29 | End: 2021-10-13

## 2021-09-29 RX ORDER — ZOSTER VACCINE RECOMBINANT, ADJUVANTED 50 MCG/0.5
0.5 KIT INTRAMUSCULAR ONCE
Qty: 1 EACH | Refills: 1 | Status: SHIPPED | OUTPATIENT
Start: 2021-09-29 | End: 2021-09-29

## 2021-09-29 NOTE — PATIENT INSTRUCTIONS
Tinha do corpo   O QUE VOCÊ PRECISA SABER:   Tinha do corpo, ou micose, é lee infecção de pele causada por um fungo  Ratna geralmente afeta a pele do rosto, tórax ou membros  A tinha do corpo é mais comum em crianças e atletas  INSTRUÇÕES APÓS A SYLWIA:   Entre em contato com seu médico se:  · Você tiver febre  · Sua erupção continuar se espalhando após sete cheung de tratamento  · Sua erupção não desaparecer em duas semanas  · A área ao redor da ferida ficar avermelhada, quente, sensível, inchada ou com cheiro ruim  · Você tiver dúvidas ou preocupações quanto ao seu problema de saúde ou yordy lidar com seng  Medicamentos:  · Medicamentos antifúngicos podem ser administrados na forma de creme ou comprimido  [place] o medicamento até seng acabar, mesmo que os sintomas da sua infecção sumam antes  · [place] seu medicamento conforme orientado  Entre em contato com o seu médico se achar que o medicamento não está ajudando ou se você apresentar efeitos colaterais  Informe a seng se você for alérgico a algum medicamento  [place] lee lista de todos os medicamentos, vitaminas e ervas (fitoterápicos) que você margret  Inclua a quantidade, quando e por que os [place]  Leve a lista ou os frascos de comprimidos às consultas de acompanhamento  Leve sua lista de medicamentos consigo em veronica de emergência  Faça o acompanhamento com seu médico conforme orientado: Anote as dúvidas que você tiver para se lembrar de perguntar sobre elas blake as consultas  Evite a transmissão da tinha do corpo:  · Lave todos os itens que entram em contato com a pele infectada  Lave todas as toalhas, roupas e lençóis com Lennox Pyo  Use sabão para roupas  Limpe o box do chuveiro, tapete e piso com um produto de limpeza antigermes ou antifúngico     · Não compartilhe itens pessoais  Não compartilhe toalhas, escovas, pente ou acessórios de cabelo  · Mantenha sua pele, cabelo e unhas limpos e secos   [place] banho todos os cheung e seque bem a pele antes de passar o medicamento na área infectada  Lave as mãos com frequência  Não coce suas feridas  Isso pode fazer com que a infecção se espalhe  · Não participe de esportes de contato, yordy luta livre, por 72 horas após o início do tratamento  [place] com seu médico antes de participar de esportes de contato  · Um animal de estimação infectado deve ser tratado por um veterinário  A falta de pelo em lee parte do corpo do animal é um sinal de infecção  Use luvas e mangas compridas se precisar manusear um animal infectado  Sempre lave as mãos após manusear o animal  Aspire sua casa com frequência para remover pelos ou pedaços de pele infectados  Desinfete as superfícies e a cama com os quais o animal tem contato  © Copyright [employer] 2021 Information is for End User's use only and may not be sold, redistributed or otherwise used for commercial purposes  All illustrations and images included in CareNotes® are the copyrighted property of A D A M , Inc  or [address] [place], [place] [address]court[place]   The above information is an  only  It is not intended as medical advice for individual conditions or treatments  Talk to your doctor, nurse or pharmacist before following any medical regimen to see if it is safe and effective for you

## 2021-09-30 ENCOUNTER — APPOINTMENT (OUTPATIENT)
Dept: LAB | Facility: HOSPITAL | Age: 46
End: 2021-09-30
Payer: COMMERCIAL

## 2021-09-30 DIAGNOSIS — R21 RASH: ICD-10-CM

## 2021-09-30 PROCEDURE — 36415 COLL VENOUS BLD VENIPUNCTURE: CPT

## 2021-09-30 PROCEDURE — 86618 LYME DISEASE ANTIBODY: CPT

## 2021-09-30 NOTE — PROGRESS NOTES
Assessment/Plan:    No problem-specific Assessment & Plan notes found for this encounter  Problem List Items Addressed This Visit     None      Visit Diagnoses     Rash    -  Primary  Likely pain male corporis due to the ring 1 recommend doing a Lyme titer    Relevant Medications    clotrimazole (LOTRIMIN) 1 % cream    Other Relevant Orders    Lyme Antibody Profile with reflex to WB    History of shingles    -  Unsure if the vaccine will be covered due to age but sent to the pharmacy and recommend discussing with the pharmacist typically be cover  Is recommended due to history of shingles  Subjective:      Patient ID: Geovany Durán is a 40 y o  female  HPI  71-year-old female here for a same-day visit for a rash  Left  It is mildly itchy  She has tried some over-the-counter creams they have not helped  She does have a history of herpes zoster which she has had twice but this does not feel the same  She has not had any fevers or chills  She does not remember any bites the area  Has been there for more than a month  The following portions of the patient's history were reviewed and updated as appropriate:   She  has a past medical history of Depression (2017) and Varicella  She   Patient Active Problem List    Diagnosis Date Noted    Encounter for annual routine gynecological examination 01/27/2020    Screening for cervical cancer 01/27/2020    Encounter for screening mammogram for malignant neoplasm of breast 01/27/2020    Overweight (BMI 25 0-29 9) 05/10/2019    Herpes zoster 09/06/2018    History of anemia 02/01/2018    Gastroesophageal reflux disease 08/06/2015     She  has a past surgical history that includes Tubal ligation (2009)    Her family history includes No Known Problems in her daughter, father, maternal grandfather, maternal grandmother, mother, paternal aunt, paternal aunt, paternal aunt, paternal aunt, paternal aunt, paternal aunt, paternal aunt, paternal aunt, paternal aunt, paternal aunt, paternal grandfather, and paternal grandmother  She  reports that she has never smoked  She has never used smokeless tobacco  She reports that she does not drink alcohol and does not use drugs  Current Outpatient Medications   Medication Sig Dispense Refill    omeprazole (PriLOSEC) 20 mg delayed release capsule Take 1 capsule (20 mg total) by mouth daily before breakfast 90 capsule 3    clotrimazole (LOTRIMIN) 1 % cream Apply topically 2 (two) times a day for 14 days 30 g 0    valACYclovir (VALTREX) 1,000 mg tablet Take 1 tablet (1,000 mg total) by mouth 3 (three) times a day for 7 days 21 tablet 0     No current facility-administered medications for this visit  Current Outpatient Medications on File Prior to Visit   Medication Sig    omeprazole (PriLOSEC) 20 mg delayed release capsule Take 1 capsule (20 mg total) by mouth daily before breakfast    valACYclovir (VALTREX) 1,000 mg tablet Take 1 tablet (1,000 mg total) by mouth 3 (three) times a day for 7 days     No current facility-administered medications on file prior to visit  She has No Known Allergies       Review of Systems   Constitutional: Negative for activity change, appetite change, chills, fatigue and unexpected weight change  HENT: Negative for dental problem, ear pain, hearing loss and sore throat  Eyes: Negative for visual disturbance  Respiratory: Negative for cough and wheezing  Cardiovascular: Negative for chest pain  Gastrointestinal: Negative for abdominal pain, constipation, diarrhea and vomiting  Genitourinary: Negative for difficulty urinating and dysuria  Musculoskeletal: Negative for arthralgias, back pain and myalgias  Skin: Positive for rash  Neurological: Negative for dizziness and headaches  Psychiatric/Behavioral: Negative for behavioral problems           Objective:      /80 (BP Location: Left arm, Patient Position: Sitting, Cuff Size: Standard)   Pulse 91   Temp 98 °F (36 7 °C) (Temporal)   Resp 16   Ht 5' 6" (1 676 m)   Wt 68 kg (150 lb)   SpO2 99%   BMI 24 21 kg/m²          Physical Exam  Vitals and nursing note reviewed  Constitutional:       Appearance: She is well-developed  HENT:      Head: Normocephalic and atraumatic  Right Ear: External ear normal       Left Ear: External ear normal       Nose: Nose normal    Eyes:      Conjunctiva/sclera: Conjunctivae normal    Neck:      Thyroid: No thyromegaly  Cardiovascular:      Rate and Rhythm: Normal rate and regular rhythm  Heart sounds: Normal heart sounds  No murmur heard  Pulmonary:      Effort: Pulmonary effort is normal  No respiratory distress  Breath sounds: Normal breath sounds  Musculoskeletal:         General: Normal range of motion  Cervical back: Normal range of motion and neck supple  Lymphadenopathy:      Cervical: No cervical adenopathy  Skin:     General: Skin is warm  Findings: Rash (3 cm red scaly patch on left thigh with white patch surrounding) present  Neurological:      Mental Status: She is alert and oriented to person, place, and time     Psychiatric:         Mood and Affect: Mood normal          Behavior: Behavior normal

## 2021-10-01 LAB — B BURGDOR IGG+IGM SER-ACNC: 13

## 2022-01-18 ENCOUNTER — IMMUNIZATIONS (OUTPATIENT)
Dept: FAMILY MEDICINE CLINIC | Facility: HOSPITAL | Age: 47
End: 2022-01-18

## 2022-01-18 DIAGNOSIS — Z23 ENCOUNTER FOR IMMUNIZATION: Primary | ICD-10-CM

## 2022-01-18 PROCEDURE — 0064A COVID-19 MODERNA VACC 0.25 ML BOOSTER: CPT

## 2022-01-18 PROCEDURE — 91306 COVID-19 MODERNA VACC 0.25 ML BOOSTER: CPT

## 2022-08-17 ENCOUNTER — ANNUAL EXAM (OUTPATIENT)
Dept: OBGYN CLINIC | Facility: CLINIC | Age: 47
End: 2022-08-17

## 2022-08-17 VITALS
WEIGHT: 153 LBS | DIASTOLIC BLOOD PRESSURE: 72 MMHG | HEIGHT: 66 IN | HEART RATE: 75 BPM | SYSTOLIC BLOOD PRESSURE: 105 MMHG | BODY MASS INDEX: 24.59 KG/M2

## 2022-08-17 DIAGNOSIS — Z01.419 ENCOUNTER FOR ANNUAL ROUTINE GYNECOLOGICAL EXAMINATION: Primary | ICD-10-CM

## 2022-08-17 DIAGNOSIS — Z12.31 ENCOUNTER FOR SCREENING MAMMOGRAM FOR MALIGNANT NEOPLASM OF BREAST: ICD-10-CM

## 2022-08-17 PROCEDURE — 99396 PREV VISIT EST AGE 40-64: CPT | Performed by: NURSE PRACTITIONER

## 2022-08-17 NOTE — PATIENT INSTRUCTIONS
Schedule mammogram  Call with needs or concerns  Return in 1 year    COVID-19 Instructions    If you are having any of the following:  Cough   Shortness of breath   Fever  If traveled within past 2 weeks internationally or to high risk [place] states  Or been in contact with someone that has     Please call either:   Your PCP office  [phone], option [phone]    They will screen you over the phone and direct you to the nearest appropriate testing location    DO NOT go to your PCP or OB office without calling first       Margaret Franklin

## 2022-10-07 ENCOUNTER — HOSPITAL ENCOUNTER (OUTPATIENT)
Dept: MAMMOGRAPHY | Facility: CLINIC | Age: 47
End: 2022-10-07
Payer: COMMERCIAL

## 2022-10-07 VITALS — HEIGHT: 66 IN | WEIGHT: 153 LBS | BODY MASS INDEX: 24.59 KG/M2

## 2022-10-07 DIAGNOSIS — Z12.31 ENCOUNTER FOR SCREENING MAMMOGRAM FOR MALIGNANT NEOPLASM OF BREAST: ICD-10-CM

## 2022-10-07 PROCEDURE — 77067 SCR MAMMO BI INCL CAD: CPT

## 2022-10-07 PROCEDURE — 77063 BREAST TOMOSYNTHESIS BI: CPT

## 2022-10-26 ENCOUNTER — OFFICE VISIT (OUTPATIENT)
Dept: FAMILY MEDICINE CLINIC | Facility: CLINIC | Age: 47
End: 2022-10-26

## 2022-10-26 VITALS
WEIGHT: 153.6 LBS | SYSTOLIC BLOOD PRESSURE: 102 MMHG | BODY MASS INDEX: 24.68 KG/M2 | OXYGEN SATURATION: 99 % | RESPIRATION RATE: 14 BRPM | HEIGHT: 66 IN | TEMPERATURE: 97.8 F | HEART RATE: 88 BPM | DIASTOLIC BLOOD PRESSURE: 64 MMHG

## 2022-10-26 DIAGNOSIS — Z09 FOLLOW-UP EXAM: ICD-10-CM

## 2022-10-26 DIAGNOSIS — K21.9 GASTROESOPHAGEAL REFLUX DISEASE WITHOUT ESOPHAGITIS: Primary | ICD-10-CM

## 2022-10-26 DIAGNOSIS — Z11.59 NEED FOR HEPATITIS C SCREENING TEST: ICD-10-CM

## 2022-10-26 DIAGNOSIS — Z23 IMMUNIZATION DUE: ICD-10-CM

## 2022-10-26 RX ORDER — OMEPRAZOLE 20 MG/1
20 CAPSULE, DELAYED RELEASE ORAL
Qty: 90 CAPSULE | Refills: 3 | Status: SHIPPED | OUTPATIENT
Start: 2022-10-26

## 2022-10-27 PROBLEM — Z09 FOLLOW-UP EXAM: Status: ACTIVE | Noted: 2022-10-27

## 2022-10-27 NOTE — PROGRESS NOTES
Name: Jessa Montano      : 1975      MRN: [id number]  Encounter Provider: Jean Marie Fernandez MD  Encounter Date: 10/26/2022   Encounter department: [facility]     1  Gastroesophageal reflux disease without esophagitis  Assessment & Plan:  · Stable  · Symptoms well-controlled on Omeprazole 20 mg daily  Medication refilled today  · We discussed about types of foods that can be gastric irritants and to avoid it  Orders:  -     omeprazole (PriLOSEC) 20 mg delayed release capsule; Take 1 capsule (20 mg total) by mouth daily before breakfast    2  Need for hepatitis C screening test  -     Hepatitis C Antibody ([facility]); Future    3  Follow-up exam  Assessment & Plan:  · Patient up to date with GYN exam  · Reports that had colonoscopy done 3 years ago and was recommended to continue follow-up in 10 years only  Instructed patient to bring us the report to add it to her chart  · Will order lipid panel (most recent one done in 2019) today and BMP  · Hepatitis C screening ordered  · Influenza vaccine administered    Orders:  -     Basic metabolic panel; Future  -     Lipid panel; Future    4  Immunization due  -     influenza vaccine, quadrivalent, 0 5 mL, preservative-free, for adult and pediatric patients 6 mos+ (AFLURIA, FLUARIX, FLULAVAL, FLUZONE)         Subjective      This is a very pleasant 56 y o  female who presents to the clinic for management of their chronic medical conditions  Patient's medical conditions are stable unless noted otherwise above  Patient has not had any recent hospitalizations, or medical emergencies since last visit  Patient has no further complaints other than what is mentioned in the ROS  Review of Systems   Constitutional: Negative for chills and fever  HENT: Negative for ear pain and sore throat  Eyes: Negative for pain and visual disturbance     Respiratory: Negative for cough and shortness of breath  Cardiovascular: Negative for chest pain and palpitations  Gastrointestinal: Negative for abdominal pain and vomiting  Genitourinary: Negative for dysuria and hematuria  Musculoskeletal: Negative for arthralgias and back pain  Skin: Negative for color change and rash  Neurological: Negative for seizures and syncope  All other systems reviewed and are negative  No current outpatient medications on file prior to visit  Objective     /64 (BP Location: Right arm, Patient Position: Sitting, Cuff Size: Standard)   Pulse 88   Temp 97 8 °F (36 6 °C) (Temporal)   Resp 14   Ht 5' 6" (1 676 m)   Wt 69 7 kg (153 lb 9 6 oz)   LMP 10/12/2022 (Within Days)   SpO2 99%   BMI 24 79 kg/m²     Physical Exam  Constitutional:       General: She is not in acute distress  Appearance: Normal appearance  She is normal weight  She is not ill-appearing, toxic-appearing or diaphoretic  HENT:      Head: Normocephalic and atraumatic  Right Ear: Tympanic membrane, ear canal and external ear normal  There is no impacted cerumen  Left Ear: Tympanic membrane, ear canal and external ear normal  There is no impacted cerumen  Nose: Nose normal  No congestion  Mouth/Throat:      Mouth: Mucous membranes are moist    Eyes:      General: No scleral icterus  Right eye: No discharge  Left eye: No discharge  Conjunctiva/sclera: Conjunctivae normal    Cardiovascular:      Rate and Rhythm: Normal rate and regular rhythm  Heart sounds: Normal heart sounds  No murmur heard  Pulmonary:      Effort: Pulmonary effort is normal  No respiratory distress  Breath sounds: Normal breath sounds  No wheezing  Abdominal:      General: Bowel sounds are normal  There is no distension  Palpations: Abdomen is soft  Tenderness: There is no abdominal tenderness  Musculoskeletal:         General: Normal range of motion  Right lower leg: No edema  Left lower leg: No edema  Skin:     General: Skin is warm  Neurological:      General: No focal deficit present  Mental Status: She is alert and oriented to person, place, and time  Cranial Nerves: No cranial nerve deficit  Psychiatric:         Mood and Affect: Mood normal          Behavior: Behavior normal          Thought Content:  Thought content normal          Judgment: Judgment normal        Yakelin Humphries MD

## 2022-10-27 NOTE — ASSESSMENT & PLAN NOTE
· Patient up to date with GYN exam  · Reports that had colonoscopy done 3 years ago and was recommended to continue follow-up in 10 years only  Instructed patient to bring us the report to add it to her chart  · Will order lipid panel (most recent one done in 2019) today and BMP     · Hepatitis C screening ordered  · Influenza vaccine administered

## 2022-10-27 NOTE — ASSESSMENT & PLAN NOTE
· Stable  · Symptoms well-controlled on Omeprazole 20 mg daily  Medication refilled today  · We discussed about types of foods that can be gastric irritants and to avoid it

## 2023-09-28 ENCOUNTER — ANNUAL EXAM (OUTPATIENT)
Dept: OBGYN CLINIC | Facility: CLINIC | Age: 48
End: 2023-09-28

## 2023-09-28 VITALS
HEART RATE: 77 BPM | WEIGHT: 147.8 LBS | DIASTOLIC BLOOD PRESSURE: 58 MMHG | SYSTOLIC BLOOD PRESSURE: 118 MMHG | HEIGHT: 66 IN | BODY MASS INDEX: 23.75 KG/M2

## 2023-09-28 DIAGNOSIS — Z01.419 ENCOUNTER FOR ANNUAL ROUTINE GYNECOLOGICAL EXAMINATION: Primary | ICD-10-CM

## 2023-09-28 DIAGNOSIS — Z12.31 ENCOUNTER FOR SCREENING MAMMOGRAM FOR MALIGNANT NEOPLASM OF BREAST: ICD-10-CM

## 2023-09-28 PROCEDURE — 99396 PREV VISIT EST AGE 40-64: CPT | Performed by: NURSE PRACTITIONER

## 2023-09-28 NOTE — PROGRESS NOTES
Annual Exam    Assessment   1. Encounter for annual routine gynecological examination        2. Encounter for screening mammogram for malignant neoplasm of breast  Mammo screening bilateral w 3d & cad        well woman       Plan       All questions answered. Breast self exam technique reviewed and patient encouraged to perform self-exam monthly. Contraception: tubal ligation. Discussed healthy lifestyle modifications. Patient Instructions   Schedule mammogram after 10/7/2023  Call with needs or concerns  Return in 1 year  Pt verbalized understanding of all discussed. Chacha Cruz is a 53 y.o.  female who presents for annual well woman exam. Periods are regular every 28-30 days, lasting 5 days. No intermenstrual bleeding, spotting, or discharge. 2020 WNL PAP and negative HPV  10/7/2022 WNL mammogram  1 partner x 29 years, denies domestic violence    Depression Screening Follow-up Plan: Patient's depression screening was negative with a PHQ-2 score of 0. Their PHQ-9 score was 4. Clinically patient does not have depression. No treatment is required. Current contraception: tubal ligation  History of abnormal Pap smear: no  Family history of uterine or ovarian cancer: no  Regular self breast exam: yes  History of abnormal mammogram: no  Family history of breast cancer: no  History of abnormal lipids: unknown  Menstrual History:  OB History        3    Para   3    Term   3       0    AB        Living   3       SAB        IAB        Ectopic        Multiple        Live Births                    Menarche age: 14  No LMP recorded. 9/15/2023       The following portions of the patient's history were reviewed and updated as appropriate: allergies, current medications, past family history, past medical history, past social history, past surgical history and problem list.    Review of Systems  Pertinent items are noted in HPI.       Objective      There were no vitals taken for this visit.     General: alert and oriented, in no acute distress, alert, appears stated age and cooperative   Heart: NSR   Lungs: clear to auscultation bilaterally, WNL respiratory effort, negative cough or SOB   Thyroid: Negative masses   Abdomen: soft, non-tender, without masses or organomegaly   Vulva: normal   Vagina: normal mucosa   Cervix: no cervical motion tenderness and no lesions   Uterus: normal size, non-tender, normal shape and consistency   Adnexa: normal adnexa   Urethra: normal   Breasts: NT,negative masses, discharge, or dimpling

## 2023-10-19 NOTE — PROGRESS NOTES
Annual Exam    Assessment   1  Encounter for annual routine gynecological examination     2  Encounter for screening mammogram for malignant neoplasm of breast       well woman       Plan       All questions answered  Breast self exam technique reviewed and patient encouraged to perform self-exam monthly  Contraception: tubal ligation  Discussed healthy lifestyle modifications  Follow up in 1 year  Mammogram      Patient Instructions   Schedule mammogram  Call with needs or concerns  Return in 1 year  Pt verbalized understanding of all discussed  Subjective      Markos Caceres is a 56 y o   female who presents for annual well woman exam  Periods are regular every 28-30 days, lasting 5 days  No intermenstrual bleeding, spotting, or discharge  Last WNL PAP and negative HPV 2020  1 partner x 28 years, denies domestic violence    Depression Screening Follow-up Plan: Patient's depression screening was negative with a PHQ-2 score of 0  Their PHQ-9 score was 4  Clinically patient does not have depression  No treatment is required  Current contraception: tubal ligation  History of abnormal Pap smear: no  Family history of uterine or ovarian cancer: no  Regular self breast exam: yes  History of abnormal mammogram: no  Family history of breast cancer: no  History of abnormal lipids: unknown  Menstrual History:  OB History        3    Para   3    Term   3       0    AB        Living   3       SAB        IAB        Ectopic        Multiple        Live Births                    Menarche age: 16  Patient's last menstrual period was 2022  The following portions of the patient's history were reviewed and updated as appropriate: allergies, current medications, past family history, past medical history, past social history, past surgical history and problem list     Review of Systems  Pertinent items are noted in HPI        Objective      /72   Pulse 75   Ht 5' 6" (8 602 m)   Wt 69 4 kg (153 lb)   LMP 08/03/2022   BMI 24 69 kg/m²     General: alert and oriented, in no acute distress, alert, appears stated age and cooperative   Heart: regular rate and rhythm, S1, S2 normal, no murmur, click, rub or gallop   Lungs: clear to auscultation bilaterally, WNL respiratory effort, negative cough or SOB   Thyroid: Negative masses   Abdomen: soft, non-tender, without masses or organomegaly   Vulva: normal   Vagina: normal mucosa   Cervix: no cervical motion tenderness and no lesions   Uterus: normal size, non-tender, normal shape and consistency   Adnexa: normal adnexa   Urethra: normal   Breasts: NT,negative masses, discharge, or dimpling [place] Counseling: I discussed with the patient the risks of tildrakizumab including but not limited to immunosuppression, malignancy, posterior leukoencephalopathy syndrome, and serious infections.  The patient understands that monitoring is required including a PPD at baseline and must alert us or the primary physician if symptoms of infection or other concerning signs are noted.

## 2023-12-29 ENCOUNTER — HOSPITAL ENCOUNTER (OUTPATIENT)
Dept: MAMMOGRAPHY | Facility: CLINIC | Age: 48
End: 2023-12-29
Payer: COMMERCIAL

## 2023-12-29 VITALS — WEIGHT: 147 LBS | HEIGHT: 66 IN | BODY MASS INDEX: 23.63 KG/M2

## 2023-12-29 DIAGNOSIS — Z12.31 ENCOUNTER FOR SCREENING MAMMOGRAM FOR MALIGNANT NEOPLASM OF BREAST: ICD-10-CM

## 2023-12-29 PROCEDURE — 77067 SCR MAMMO BI INCL CAD: CPT

## 2023-12-29 PROCEDURE — 77063 BREAST TOMOSYNTHESIS BI: CPT

## 2024-01-02 DIAGNOSIS — R92.8 ABNORMAL MAMMOGRAM: Primary | ICD-10-CM

## 2024-01-12 ENCOUNTER — OFFICE VISIT (OUTPATIENT)
Dept: FAMILY MEDICINE CLINIC | Facility: CLINIC | Age: 49
End: 2024-01-12

## 2024-01-12 VITALS
HEART RATE: 68 BPM | SYSTOLIC BLOOD PRESSURE: 110 MMHG | BODY MASS INDEX: 24.37 KG/M2 | OXYGEN SATURATION: 98 % | TEMPERATURE: 97.6 F | WEIGHT: 151 LBS | DIASTOLIC BLOOD PRESSURE: 70 MMHG

## 2024-01-12 DIAGNOSIS — K21.9 GASTROESOPHAGEAL REFLUX DISEASE WITHOUT ESOPHAGITIS: ICD-10-CM

## 2024-01-12 DIAGNOSIS — Z00.00 HEALTHCARE MAINTENANCE: ICD-10-CM

## 2024-01-12 DIAGNOSIS — Z00.00 ANNUAL PHYSICAL EXAM: Primary | ICD-10-CM

## 2024-01-12 DIAGNOSIS — Z23 ENCOUNTER FOR IMMUNIZATION: ICD-10-CM

## 2024-01-12 PROBLEM — Z86.2 HISTORY OF ANEMIA: Status: RESOLVED | Noted: 2018-02-01 | Resolved: 2024-01-12

## 2024-01-12 PROBLEM — Z01.419 ENCOUNTER FOR ANNUAL ROUTINE GYNECOLOGICAL EXAMINATION: Status: RESOLVED | Noted: 2020-01-27 | Resolved: 2024-01-12

## 2024-01-12 PROCEDURE — 90471 IMMUNIZATION ADMIN: CPT | Performed by: FAMILY MEDICINE

## 2024-01-12 PROCEDURE — 90686 IIV4 VACC NO PRSV 0.5 ML IM: CPT | Performed by: FAMILY MEDICINE

## 2024-01-12 PROCEDURE — 99213 OFFICE O/P EST LOW 20 MIN: CPT | Performed by: FAMILY MEDICINE

## 2024-01-12 PROCEDURE — 99396 PREV VISIT EST AGE 40-64: CPT | Performed by: FAMILY MEDICINE

## 2024-01-12 RX ORDER — OMEPRAZOLE 20 MG/1
20 CAPSULE, DELAYED RELEASE ORAL
Qty: 90 CAPSULE | Refills: 3 | Status: SHIPPED | OUTPATIENT
Start: 2024-01-12

## 2024-01-12 RX ORDER — PHENOL 1.4 %
10 AEROSOL, SPRAY (ML) MUCOUS MEMBRANE
COMMUNITY

## 2024-01-12 NOTE — ASSESSMENT & PLAN NOTE
-reports has been using omeprazole 20mg for years once daily    PLAN  -counseling regarding increased risk of bone fracture with long term use particularly significant once menopause begins  -discussed lifestyle changes including small portion sizes, avoiding vs minimizing caffeine (chocolate, soda), acidic foods, spicy foods

## 2024-01-12 NOTE — ASSESSMENT & PLAN NOTE
Last Pap Smear: In September 2023 by OB/GYN per patient  and normal  Mammogram: Mammogram on 12/29/23 focal asymmetry of left breast , has f/u ultrasound in February  Colonoscopy: 2021 at [facility] and normal per patient

## 2024-01-12 NOTE — PROGRESS NOTES
ADULT ANNUAL PHYSICAL  [facility]    NAME: Jesi Du  AGE: 48 y.o. SEX: female  : 1975     DATE: 2024     Assessment and Plan:     Problem List Items Addressed This Visit       Gastroesophageal reflux disease     -reports has been using omeprazole 20mg for years once daily    PLAN  -counseling regarding increased risk of bone fracture with long term use particularly significant once menopause begins  -discussed lifestyle changes including small portion sizes, avoiding vs minimizing caffeine (chocolate, soda), acidic foods, spicy foods         Relevant Medications    omeprazole (PriLOSEC) 20 mg delayed release capsule    Healthcare maintenance     Last Pap Smear: In 2023 by OB/GYN per patient  and normal  Mammogram: Mammogram on 23 focal asymmetry of left breast , has f/u ultrasound in February  Colonoscopy:  at [facility] and normal per patient             Other Visit Diagnoses       Annual physical exam    -  Primary    Relevant Orders    CBC and differential    Comprehensive metabolic panel    Lipid Panel with Direct LDL reflex    Hepatitis C antibody    Encounter for immunization        Relevant Orders    influenza vaccine, quadrivalent, 0.5 mL, preservative-free, for adult and pediatric patients 6 mos+ (AFLURIA, FLUARIX, FLULAVAL, FLUZONE) (Completed)            Immunizations and preventive care screenings were discussed with patient today. Appropriate education was printed on patient's after visit summary.    Counseling:  Alcohol/drug use: discussed moderation in alcohol intake, the recommendations for healthy alcohol use, and avoidance of illicit drug use.  Dental Health: discussed importance of regular tooth brushing, flossing, and dental visits.  Injury prevention: discussed safety/seat belts, safety helmets, smoke detectors, carbon dioxide detectors, and smoking near bedding or upholstery.  Sexual  health: discussed sexually transmitted diseases, partner selection, use of condoms, avoidance of unintended pregnancy, and contraceptive alternatives.  Exercise: the importance of regular exercise/physical activity was discussed. Recommend exercise 3-5 times per week for at least 30 minutes.          No follow-ups on file.     Chief Complaint:     Chief Complaint   Patient presents with    Physical Exam      History of Present Illness:     Adult Annual Physical   Patient here for a comprehensive physical exam. The patient reports no problems.    Diet and Physical Activity  Diet/Nutrition: well balanced diet, limited junk food, consuming 3-5 servings of fruits/vegetables daily, adequate fiber intake, and adequate whole grain intake.   Exercise: no formal exercise.      Depression Screening  PHQ-2/9 Depression Screening    Little interest or pleasure in doing things: 0 - not at all  Feeling down, depressed, or hopeless: 0 - not at all  PHQ-2 Score: 0  PHQ-2 Interpretation: Negative depression screen       General Health  Sleep: sleeps well and gets 7-8 hours of sleep on average.   Hearing: normal - bilateral.  Vision: most recent eye exam <1 year ago, wears glasses, and (wears glasses for reading) .   Dental: regular dental visits, brushes teeth twice daily, and flosses teeth nightly .       /GYN Health  Follows with gynecology? yes   Patient is: premenopausal  Last menstrual period: 3 weeks ago (gets them monthly  Contraceptive method:  tubal ligation .    Advanced Care Planning  Do you have an advanced directive? no  Do you have a durable medical power of ? no     Review of Systems:     Review of Systems   Constitutional:  Negative for appetite change, chills, fever and unexpected weight change.   HENT:  Negative for ear pain and sore throat.    Eyes:  Negative for pain and visual disturbance.   Respiratory:  Negative for cough and shortness of breath.    Cardiovascular:  Negative for chest pain and  palpitations.   Gastrointestinal:  Negative for abdominal pain, diarrhea, nausea and vomiting.   Genitourinary:  Negative for dysuria and hematuria.   Musculoskeletal:  Negative for arthralgias and back pain.   Skin:  Negative for color change and rash.   Neurological:  Negative for seizures and syncope.   Hematological:  Does not bruise/bleed easily.   All other systems reviewed and are negative.     Past Medical History:     Past Medical History:   Diagnosis Date    Depression 2017    well-managed with Zoloft    Varicella       Past Surgical History:     Past Surgical History:   Procedure Laterality Date    TUBAL LIGATION  2009      Social History:     Social History     Socioeconomic History    Marital status: /Civil Union     Spouse name: None    Number of children: None    Years of education: None    Highest education level: None   Occupational History    Occupation: [employer]     Comment: processor   Tobacco Use    Smoking status: Never    Smokeless tobacco: Never   Vaping Use    Vaping status: Never Used   Substance and Sexual Activity    Alcohol use: No    Drug use: No    Sexual activity: Yes     Partners: Male     Birth control/protection: Female Sterilization   Other Topics Concern    None   Social History Narrative    Lives in House    Brianne dog    Lives with , 3 kids not at home      Social Determinants of Health     Financial Resource Strain: Low Risk  (9/28/2023)    Overall Financial Resource Strain (CARDIA)     Difficulty of Paying Living Expenses: Not hard at all   Food Insecurity: No Food Insecurity (9/28/2023)    Hunger Vital Sign     Worried About Running Out of Food in the Last Year: Never true     Ran Out of Food in the Last Year: Never true   Transportation Needs: No Transportation Needs (9/28/2023)    PRAPARE - Transportation     Lack of Transportation (Medical): No     Lack of Transportation (Non-Medical): No   Physical Activity: Not on file   Stress: Not on file   Social  Connections: Not on file   Intimate Partner Violence: Not on file   Housing Stability: Low Risk  (8/17/2022)    Housing Stability Vital Sign     Unable to Pay for Housing in the Last Year: No     Number of Places Lived in the Last Year: 1     Unstable Housing in the Last Year: No      Family History:     Family History   Problem Relation Age of Onset    Hypertension Mother     No Known Problems Father     No Known Problems Daughter     No Known Problems Maternal Grandmother     No Known Problems Maternal Grandfather     No Known Problems Paternal Grandmother     No Known Problems Paternal Grandfather     No Known Problems Paternal Aunt     No Known Problems Paternal Aunt     No Known Problems Paternal Aunt     No Known Problems Paternal Aunt     No Known Problems Paternal Aunt     No Known Problems Paternal Aunt     No Known Problems Paternal Aunt     No Known Problems Paternal Aunt     No Known Problems Paternal Aunt     No Known Problems Paternal Aunt     Cancer Neg Hx     Breast cancer Neg Hx       Current Medications:     Current Outpatient Medications   Medication Sig Dispense Refill    Melatonin 10 MG TABS Take 10 mg by mouth daily at bedtime      omeprazole (PriLOSEC) 20 mg delayed release capsule Take 1 capsule (20 mg total) by mouth daily before breakfast 90 capsule 3     No current facility-administered medications for this visit.      Allergies:     No Known Allergies   Physical Exam:     /70 (BP Location: Left arm, Patient Position: Sitting, Cuff Size: Standard)   Pulse 68   Temp 97.6 °F (36.4 °C) (Temporal)   Wt 68.5 kg (151 lb)   SpO2 98%   BMI 24.37 kg/m²     Physical Exam  Vitals and nursing note reviewed.   Constitutional:       General: She is not in acute distress.     Appearance: Normal appearance. She is well-developed and normal weight.   HENT:      Head: Normocephalic and atraumatic.      Right Ear: Tympanic membrane, ear canal and external ear normal.      Left Ear: Tympanic  membrane, ear canal and external ear normal.      Nose: Nose normal. No congestion or rhinorrhea.      Mouth/Throat:      Mouth: Mucous membranes are moist.      Pharynx: Oropharynx is clear. No oropharyngeal exudate or posterior oropharyngeal erythema.   Eyes:      General: No scleral icterus.        Right eye: No discharge.         Left eye: No discharge.      Extraocular Movements: Extraocular movements intact.      Conjunctiva/sclera: Conjunctivae normal.      Pupils: Pupils are equal, round, and reactive to light.   Cardiovascular:      Rate and Rhythm: Normal rate and regular rhythm.      Pulses: Normal pulses.      Heart sounds: Normal heart sounds. No murmur heard.     No friction rub. No gallop.   Pulmonary:      Effort: Pulmonary effort is normal.      Breath sounds: Normal breath sounds. No wheezing, rhonchi or rales.   Abdominal:      General: Abdomen is flat. Bowel sounds are normal. There is no distension.      Palpations: Abdomen is soft. There is no mass.      Tenderness: There is no abdominal tenderness.      Hernia: No hernia is present.   Musculoskeletal:         General: No swelling. Normal range of motion.      Cervical back: Normal range of motion and neck supple. No rigidity or tenderness.      Comments: 5+ upper and lower extremity strength   Lymphadenopathy:      Cervical: No cervical adenopathy.   Skin:     General: Skin is warm and dry.      Capillary Refill: Capillary refill takes less than 2 seconds.      Findings: No rash.   Neurological:      General: No focal deficit present.      Mental Status: She is alert and oriented to person, place, and time.      Cranial Nerves: No cranial nerve deficit.      Sensory: No sensory deficit.      Motor: No weakness.      Coordination: Coordination normal.      Gait: Gait normal.      Deep Tendon Reflexes: Reflexes normal.   Psychiatric:         Mood and Affect: Mood normal.         Behavior: Behavior normal.         Thought Content: Thought content  normal.         Judgment: Judgment normal.          Rebecca Fay Kab-Perlman, MD  [facility]

## 2024-01-13 ENCOUNTER — LAB (OUTPATIENT)
Dept: LAB | Facility: HOSPITAL | Age: 49
End: 2024-01-13
Payer: COMMERCIAL

## 2024-01-13 DIAGNOSIS — Z00.00 ANNUAL PHYSICAL EXAM: ICD-10-CM

## 2024-01-13 LAB
ALBUMIN SERPL BCP-MCNC: 4.5 G/DL (ref 3.5–5)
ALP SERPL-CCNC: 77 U/L (ref 34–104)
ALT SERPL W P-5'-P-CCNC: 13 U/L (ref 7–52)
ANION GAP SERPL CALCULATED.3IONS-SCNC: 8 MMOL/L
AST SERPL W P-5'-P-CCNC: 16 U/L (ref 13–39)
BASOPHILS # BLD AUTO: 0.02 THOUSANDS/ÂΜL (ref 0–0.1)
BASOPHILS NFR BLD AUTO: 0 % (ref 0–1)
BILIRUB SERPL-MCNC: 0.62 MG/DL (ref 0.2–1)
BUN SERPL-MCNC: 11 MG/DL (ref 5–25)
CALCIUM SERPL-MCNC: 9.2 MG/DL (ref 8.4–10.2)
CHLORIDE SERPL-SCNC: 103 MMOL/L (ref 96–108)
CHOLEST SERPL-MCNC: 201 MG/DL
CO2 SERPL-SCNC: 26 MMOL/L (ref 21–32)
CREAT SERPL-MCNC: 0.8 MG/DL (ref 0.6–1.3)
EOSINOPHIL # BLD AUTO: 0.06 THOUSAND/ÂΜL (ref 0–0.61)
EOSINOPHIL NFR BLD AUTO: 1 % (ref 0–6)
ERYTHROCYTE [DISTWIDTH] IN BLOOD BY AUTOMATED COUNT: 15.7 % (ref 11.6–15.1)
GFR SERPL CREATININE-BSD FRML MDRD: 88 ML/MIN/1.73SQ M
GLUCOSE P FAST SERPL-MCNC: 86 MG/DL (ref 65–99)
HCT VFR BLD AUTO: 38.7 % (ref 34.8–46.1)
HDLC SERPL-MCNC: 79 MG/DL
HGB BLD-MCNC: 12.3 G/DL (ref 11.5–15.4)
IMM GRANULOCYTES # BLD AUTO: 0.01 THOUSAND/UL (ref 0–0.2)
IMM GRANULOCYTES NFR BLD AUTO: 0 % (ref 0–2)
LDLC SERPL CALC-MCNC: 111 MG/DL (ref 0–100)
LYMPHOCYTES # BLD AUTO: 1.6 THOUSANDS/ÂΜL (ref 0.6–4.47)
LYMPHOCYTES NFR BLD AUTO: 34 % (ref 14–44)
MCH RBC QN AUTO: 25.3 PG (ref 26.8–34.3)
MCHC RBC AUTO-ENTMCNC: 31.8 G/DL (ref 31.4–37.4)
MCV RBC AUTO: 80 FL (ref 82–98)
MONOCYTES # BLD AUTO: 0.39 THOUSAND/ÂΜL (ref 0.17–1.22)
MONOCYTES NFR BLD AUTO: 8 % (ref 4–12)
NEUTROPHILS # BLD AUTO: 2.59 THOUSANDS/ÂΜL (ref 1.85–7.62)
NEUTS SEG NFR BLD AUTO: 57 % (ref 43–75)
NRBC BLD AUTO-RTO: 0 /100 WBCS
PLATELET # BLD AUTO: 324 THOUSANDS/UL (ref 149–390)
PMV BLD AUTO: 10.3 FL (ref 8.9–12.7)
POTASSIUM SERPL-SCNC: 4.3 MMOL/L (ref 3.5–5.3)
PROT SERPL-MCNC: 7.2 G/DL (ref 6.4–8.4)
RBC # BLD AUTO: 4.87 MILLION/UL (ref 3.81–5.12)
SODIUM SERPL-SCNC: 137 MMOL/L (ref 135–147)
TRIGL SERPL-MCNC: 56 MG/DL
WBC # BLD AUTO: 4.67 THOUSAND/UL (ref 4.31–10.16)

## 2024-01-13 PROCEDURE — 80061 LIPID PANEL: CPT

## 2024-01-13 PROCEDURE — 80053 COMPREHEN METABOLIC PANEL: CPT

## 2024-01-13 PROCEDURE — 86803 HEPATITIS C AB TEST: CPT

## 2024-01-13 PROCEDURE — 85025 COMPLETE CBC W/AUTO DIFF WBC: CPT

## 2024-01-13 PROCEDURE — 36415 COLL VENOUS BLD VENIPUNCTURE: CPT

## 2024-01-14 LAB — HCV AB SER QL: NORMAL

## 2024-01-15 NOTE — RESULT ENCOUNTER NOTE
Please call patient let her know that her cholesterol and LDL is slightly elevated. She can improve these values through lifestyle modifications and does not require medication at this time. Daily exercise for 20 minutes and staying away from fatty fried foods, increasing lean protein and vegetables etc. If she would like to discuss diet she can make a visit to do so. As well, please let her know that she may have a slight iron deficiency and I would recommend she increase her foods high in iron. Avoid milk with iron as milk prevents absorption. Rather, vitamin C with iron helps. For example, if she eats dark leafy greens or meats high in iron, have some orange juice with it.

## 2024-02-21 PROBLEM — Z12.4 SCREENING FOR CERVICAL CANCER: Status: RESOLVED | Noted: 2020-01-27 | Resolved: 2024-02-21

## 2024-02-21 PROBLEM — Z00.00 HEALTHCARE MAINTENANCE: Status: RESOLVED | Noted: 2024-01-12 | Resolved: 2024-02-21

## 2024-02-22 ENCOUNTER — HOSPITAL ENCOUNTER (OUTPATIENT)
Dept: MAMMOGRAPHY | Facility: CLINIC | Age: 49
End: 2024-02-22
Payer: COMMERCIAL

## 2024-02-22 VITALS — BODY MASS INDEX: 24.27 KG/M2 | HEIGHT: 66 IN | WEIGHT: 151 LBS

## 2024-02-22 DIAGNOSIS — R92.8 ABNORMAL MAMMOGRAM: ICD-10-CM

## 2024-02-22 PROCEDURE — G0279 TOMOSYNTHESIS, MAMMO: HCPCS

## 2024-02-22 PROCEDURE — 77065 DX MAMMO INCL CAD UNI: CPT

## 2024-02-22 PROCEDURE — 76642 ULTRASOUND BREAST LIMITED: CPT

## 2024-03-15 ENCOUNTER — TELEPHONE (OUTPATIENT)
Dept: FAMILY MEDICINE CLINIC | Facility: CLINIC | Age: 49
End: 2024-03-15

## 2024-03-15 NOTE — TELEPHONE ENCOUNTER
----- Message from Aaliyah Sunshine sent at 1/18/2024  7:31 PM EST -----    ----- Message -----  From: Rebecca Fay Kab-Perlman, MD  Sent: 1/15/2024   6:10 PM EST  To: [facility] Clerical; #    Dear team,     This patient recently had an annual physical with me and now I see two upcoming encounters for annual physical with her PCP. This message is to make the team aware of this to change the reason for exam.     Thanks

## 2024-08-22 NOTE — PROGRESS NOTES
[address] [place] [address]  Annual Well Adult Visit      Assessment/Plan     Encounter for annual physical exam  -Immunizations, labs and lifestyle modifications discussed  GERD  Stable  Continue Omeprazole 20 mg daily, refilled   Avoid acidic foods such as tomatoes, spicy food, chocolate, alcohol, caffeine  Instructed to elevate head of bed to at least 6 inches before going to sleep and avoid late dinners    History of anemia  Not taking any Fe supplementation at the moment  Denies fatigue, shortness of breath, weakness, dizziness  Will order CBC to ensure Hgb is stable      1  Annual physical female exam   2  Patient Counseling:   · Nutrition: Stressed importance of a well balanced diet, moderation of sodium/saturated fat, caloric balance and sufficient intake of fiber  · Exercise: Stressed the importance of regular exercise with a goal of 150 minutes per week  · Dental Health: Discussed daily flossing and brushing and regular dental visits   · Sexuality: Discussed sexually transmitted infections, use of condoms and prevention of unintended pregnancy  · Alcohol Use:  Recommended moderation of alcohol intake    · Immunizations reviewed  · Discussed benefits of screening  · Discussed the patient's BMI with her  The BMI is in the acceptable range  3  Cancer Screening  Follows with Ob-gyn who orders her mammogram  She will setup an appt with them soon  4  Labs: CBC, CMP, HIV  5  Follow up in one year  Subjective     Estella Myrick is a 41 y o   female and is here for routine health maintenance  The patient reports no problems      History of Present Illness     HPI    Well Adult Physical   Patient here for a comprehensive physical exam       Diet and Physical Activity  Diet: well balanced diet  Weight concerns: None, patient's BMI is between 18 5-24 9  Exercise: infrequently      Depression Screen  PHQ-9 Depression Screening    PHQ-9:   Frequency of the following problems over the Patient using paper and pen to communicate today.    He states he ran out of metformin and does not check blood sugars. Requesting something for pain. Previously took NORCO for leg and foot nerve pain   Transitional Care Office Visit    Date of Face-to-Face: 8/22/2024    Here for follow after hospitalization for: laryngeal cancer    Persons at visit:  Self    Medication changes during hospitalization: Added:  duoneb nebulizer solution., metformin 500 mg BID, Oxycodone 15 mg IR, Lorazepam 1mg .    Procedures during hospitalization:    .    Activity: activity as tolerated.    Any medication changes since post-hospitalization phone call?  No.    Any treatment changes since post-hospitalization phone call?  No.    Other follow-up appointments scheduled:  Seeing Dr. Ag- oncologist. Chemo in [place]- currently waiting to start                  past two weeks:      Little interest or pleasure in doing things: 0 - not at all  Feeling down, depressed, or hopeless: 0 - not at all  PHQ-2 Score: 0          General Health  Hearing: Normal:  bilateral  Vision: most recent eye exam >1 year  Dental: regular dental visits, no dental visits for >1 year and brushes teeth twice daily     History:  LMP: Patient's last menstrual period was 2021  Menopause: N/A  : G3  Para: P3    Cancer Screening  Colonoscopy: 3 years ago with nl results  Recommendation to follow up in 10 yrs  She will bring the records  Mammogram: was done in 2020  Pap: normal (2020)  Abnormal pap? no  Smoker: No Annual screening with low-dose helical computed tomography (CT) for patients age 54 to 76 years with history of smoking at least 30 pack-years and, if a former smoker, had quit within the previous 15 years      The following portions of the patient's history were reviewed and updated as appropriate: allergies, current medications, past family history, past medical history, past social history, past surgical history and problem list     Review of Systems     Review of Systems   Constitutional: Negative for chills, fatigue and fever  HENT: Negative for sore throat and trouble swallowing  Eyes: Negative for visual disturbance  Respiratory: Negative for cough and shortness of breath  Cardiovascular: Negative for chest pain and leg swelling  Gastrointestinal: Negative for abdominal pain, blood in stool, constipation, diarrhea, nausea and vomiting  Genitourinary: Negative for dysuria and hematuria  Musculoskeletal: Negative for gait problem  Skin: Negative for rash  Neurological: Negative for dizziness, weakness and headaches  Psychiatric/Behavioral: Negative for agitation         Past Medical History     Past Medical History:   Diagnosis Date    Depression 2017    well-managed with Zoloft       Past Surgical History     Past Surgical History:   Procedure Laterality Date    TUBAL LIGATION  2009       Social History     Social History     Socioeconomic History    Marital status: /Civil Union     Spouse name: None    Number of children: None    Years of education: None    Highest education level: None   Occupational History    None   Social Needs    Financial resource strain: None    Food insecurity     Worry: None     Inability: None    Transportation needs     Medical: None     Non-medical: None   Tobacco Use    Smoking status: Never Smoker    Smokeless tobacco: Never Used   Substance and Sexual Activity    Alcohol use: No    Drug use: No    Sexual activity: Yes     Partners: Male   Lifestyle    Physical activity     Days per week: None     Minutes per session: None    Stress: None   Relationships    Social connections     Talks on phone: None     Gets together: None     Attends [employer] service: None     Active member of club or organization: None     Attends meetings of clubs or organizations: None     Relationship status: None    Intimate partner violence     Fear of current or ex partner: None     Emotionally abused: None     Physically abused: None     Forced sexual activity: None   Other Topics Concern    None   Social History Narrative    None       Family History     Family History   Problem Relation Age of Onset    No Known Problems Mother     No Known Problems Father     No Known Problems Daughter     No Known Problems Maternal Grandmother     No Known Problems Maternal Grandfather     No Known Problems Paternal Grandmother     No Known Problems Paternal Grandfather     No Known Problems Paternal Aunt     No Known Problems Paternal Aunt     No Known Problems Paternal Aunt     No Known Problems Paternal Aunt     No Known Problems Paternal Aunt     No Known Problems Paternal Aunt     No Known Problems Paternal Aunt     No Known Problems Paternal Aunt     No Known Problems Paternal Aunt     No Known Problems dependence with current use (HCC)    Type 2 diabetes mellitus with diabetic neuropathy    Polyneuropathy associated with underlying disease (HCC)    Vocal cord mass    Vocal cord paralysis    Larynx carcinoma (HCC)    Acute respiratory failure with hypoxia (HCC)    Tobacco dependence    Tracheostomy in place (HCC)    Acute neck pain    ACP (advance care planning)    Palliative care encounter    Squamous cell carcinoma of subglottis (HCC)    Laryngeal cancer (HCC)    Postop check        Current medications are:  Outpatient Medications Marked as Taking for the 8/22/24 encounter (Office Visit) with Alley Vila MD   Medication Sig Dispense Refill    losartan-hydroCHLOROthiazide (HYZAAR) 100-25 MG per tablet TAKE 1 TABLET DAILY 90 tablet 0    betamethasone valerate (VALISONE) 0.1 % cream Apply topically 2 times daily. 45 g 2    pimozide (ORAP) 1 MG tablet TAKE 2 TABLETS TWICE A  tablet 0    losartan (COZAAR) 100 MG tablet Take 1 tablet by mouth daily 100-25mg      docusate sodium (COLACE) 100 MG capsule Take 1 capsule by mouth 2 times daily      acetaminophen (TYLENOL) 500 MG tablet Take 1 tablet by mouth as needed for Pain 2 tablets as needed      Nebulizers (COMPRESSOR/NEBULIZER) MISC Use four times per day for SOB 1 each 3    ipratropium 0.5 mg-albuterol 2.5 mg (DUONEB) 0.5-2.5 (3) MG/3ML SOLN nebulizer solution Inhale 3 mLs into the lungs every 4 hours (while awake) 360 mL 0    amLODIPine (NORVASC) 10 MG tablet Take 1 tablet by mouth daily 30 tablet 3    omeprazole (PRILOSEC) 20 MG delayed release capsule TAKE 1 CAPSULE DAILY 90 capsule 0    atorvastatin (LIPITOR) 40 MG tablet Take 1 tablet by mouth daily 90 tablet 0    hydroCHLOROthiazide (HYDRODIURIL) 25 MG tablet Take 1 tablet by mouth daily 90 tablet 0    Diabetic Shoe MISC by Does not apply route Dispense DM shoe and insoles.  custom accomodative offloading insole with toe filler L side.     Amputated great toe of left foot (HCC)  (primary encounter  Paternal Aunt     Cancer Neg Hx     Breast cancer Neg Hx        Current Medications       Current Outpatient Medications:     omeprazole (PriLOSEC) 20 mg delayed release capsule, Take 1 capsule (20 mg total) by mouth daily before breakfast, Disp: 90 capsule, Rfl: 3    valACYclovir (VALTREX) 1,000 mg tablet, Take 1 tablet (1,000 mg total) by mouth 3 (three) times a day for 7 days, Disp: 21 tablet, Rfl: 0     Allergies     No Known Allergies    Objective     /76 (BP Location: Left arm, Patient Position: Sitting, Cuff Size: Standard)   Pulse 97   Temp (!) 96 7 °F (35 9 °C) (Temporal)   Resp 18   Ht 5' 4 75" (1 645 m)   Wt 67 kg (147 lb 9 6 oz)   LMP 05/01/2021   SpO2 99%   BMI 24 75 kg/m²      Physical Exam  Vitals signs and nursing note reviewed  Constitutional:       General: She is not in acute distress  Appearance: Normal appearance  She is well-developed and normal weight  She is not ill-appearing, toxic-appearing or diaphoretic  HENT:      Head: Normocephalic and atraumatic  Nose: Nose normal    Eyes:      Extraocular Movements: Extraocular movements intact  Conjunctiva/sclera: Conjunctivae normal       Comments: No scleral pallor   Neck:      Musculoskeletal: Normal range of motion and neck supple  Cardiovascular:      Rate and Rhythm: Normal rate and regular rhythm  Heart sounds: Normal heart sounds  Pulmonary:      Effort: Pulmonary effort is normal  No respiratory distress  Breath sounds: Normal breath sounds  Abdominal:      General: Bowel sounds are normal       Palpations: Abdomen is soft  Tenderness: There is no abdominal tenderness  Musculoskeletal: Normal range of motion  General: No tenderness  Right lower leg: No edema  Left lower leg: No edema  Skin:     General: Skin is warm  Findings: No rash  Neurological:      Mental Status: She is alert and oriented to person, place, and time     Psychiatric:         Mood and Affect: Mood normal          Behavior: Behavior normal          Thought Content:  Thought content normal          Judgment: Judgment normal            No exam data present    Health Maintenance     Health Maintenance   Topic Date Due    HIV Screening  Never done    COVID-19 Vaccine (1) Never done    Annual Physical  01/27/2021    MAMMOGRAM  03/18/2021    Influenza Vaccine (Season Ended) 09/01/2021    Depression Screening PHQ  05/04/2022    BMI: Adult  05/04/2022    DTaP,Tdap,and Td Vaccines (2 - Td) 04/30/2024    Cervical Cancer Screening  01/27/2025    Pneumococcal Vaccine: Pediatrics (0 to 5 Years) and At-Risk Patients (6 to 59 Years)  Aged Out    HIB Vaccine  Aged Out    Hepatitis B Vaccine  Aged Out    IPV Vaccine  Aged Out    Hepatitis A Vaccine  Aged Out    Meningococcal ACWY Vaccine  Aged Out    HPV Vaccine  Aged [employer] History   Administered Date(s) Administered    INFLUENZA 04/30/2014    Tdap 04/30/2014         Alexia López MD

## 2024-10-01 ENCOUNTER — ANNUAL EXAM (OUTPATIENT)
Dept: OBGYN CLINIC | Facility: CLINIC | Age: 49
End: 2024-10-01

## 2024-10-01 VITALS
BODY MASS INDEX: 24.81 KG/M2 | DIASTOLIC BLOOD PRESSURE: 78 MMHG | WEIGHT: 154.4 LBS | HEIGHT: 66 IN | HEART RATE: 77 BPM | SYSTOLIC BLOOD PRESSURE: 115 MMHG

## 2024-10-01 DIAGNOSIS — Z01.419 ENCOUNTER FOR ANNUAL ROUTINE GYNECOLOGICAL EXAMINATION: Primary | ICD-10-CM

## 2024-10-01 DIAGNOSIS — Z12.31 ENCOUNTER FOR SCREENING MAMMOGRAM FOR MALIGNANT NEOPLASM OF BREAST: ICD-10-CM

## 2024-10-01 PROCEDURE — 99396 PREV VISIT EST AGE 40-64: CPT | Performed by: NURSE PRACTITIONER

## 2024-10-01 NOTE — PROGRESS NOTES
Annual Exam    Assessment   1. Encounter for annual routine gynecological examination        2. Encounter for screening mammogram for malignant neoplasm of breast  Mammo screening bilateral w 3d and cad        well woman       Plan       All questions answered.  Breast self exam technique reviewed and patient encouraged to perform self-exam monthly.  Contraception: tubal ligation.  Discussed healthy lifestyle modifications.  Follow up in 1 year.  Mammogram.     Patient Instructions   Schedule mammogram after 2022  Call with needs or concerns  Annual GYN exam in 1 year  Pt verbalized understanding of all discussed.      Subjective      Jesi Du is a 49 y.o.  female who presents for annual well woman exam. Periods are regular every 28-30 days, lasting 5 days. No intermenstrual bleeding, spotting, or discharge.  Last mammogram 2024 L breast asymmetry  Last WNL PAP and negative HPV  2020 WNL PAP and negative HPV  1 partner x 30 years, denies domestic violence   Colorectal screening due 2031    Depression Screening Follow-up Plan: Patient's depression screening was negative with a PHQ-2 score of 0. Their PHQ-9 score was  0. Clinically patient does not have depression. No treatment is required.      Current contraception: tubal ligation  History of abnormal Pap smear: no  Family history of uterine or ovarian cancer: no  Regular self breast exam: yes  History of abnormal mammogram: L breast asymmetry, negative malignancy   Family history of breast cancer: no  History of abnormal lipids: unknown  Menstrual History:  OB History          3    Para   3    Term   3       0    AB   0    Living   3         SAB   0    IAB   0    Ectopic   0    Multiple   0    Live Births   3                Menarche age: 16  Patient's last menstrual period was 2024 (approximate).       The following portions of the patient's history were reviewed and updated as appropriate: allergies, current  "medications, past family history, past medical history, past social history, past surgical history and problem list.    Review of Systems  Pertinent items are noted in HPI.      Objective      /78 (BP Location: Left arm, Patient Position: Sitting, Cuff Size: Standard)   Pulse 77   Ht 5' 6\" (1.676 m)   Wt 70 kg (154 lb 6.4 oz)   LMP 09/16/2024 (Approximate)   BMI 24.92 kg/m²     General: alert and oriented, in no acute distress, alert, appears stated age, and cooperative   Heart: NSR   Lungs: clear to auscultation bilaterally, WNL respiratory effort, negative cough or SOB   Thyroid: Negative masses palpable   Abdomen: soft, non-tender, without masses or organomegaly palpable   Vulva: normal   Vagina: normal mucosa   Cervix: no cervical motion tenderness and no lesions   Uterus: normal size, non-tender, normal shape and consistency   Adnexa: normal adnexa   Urethra: normal   Breasts: NT,negative masses palpable, negative discharge, or dimpling             "

## 2025-01-13 ENCOUNTER — OFFICE VISIT (OUTPATIENT)
Dept: FAMILY MEDICINE CLINIC | Facility: CLINIC | Age: 50
End: 2025-01-13

## 2025-01-13 VITALS
TEMPERATURE: 97.5 F | OXYGEN SATURATION: 100 % | WEIGHT: 153.8 LBS | SYSTOLIC BLOOD PRESSURE: 120 MMHG | HEIGHT: 66 IN | BODY MASS INDEX: 24.72 KG/M2 | HEART RATE: 84 BPM | DIASTOLIC BLOOD PRESSURE: 70 MMHG | RESPIRATION RATE: 14 BRPM

## 2025-01-13 DIAGNOSIS — Z23 ENCOUNTER FOR IMMUNIZATION: ICD-10-CM

## 2025-01-13 DIAGNOSIS — Z83.518 FAMILY HISTORY OF CATARACTS: ICD-10-CM

## 2025-01-13 DIAGNOSIS — Z00.00 HEALTHCARE MAINTENANCE: ICD-10-CM

## 2025-01-13 DIAGNOSIS — Z00.00 ANNUAL PHYSICAL EXAM: Primary | ICD-10-CM

## 2025-01-13 PROCEDURE — 90715 TDAP VACCINE 7 YRS/> IM: CPT | Performed by: FAMILY MEDICINE

## 2025-01-13 PROCEDURE — 90472 IMMUNIZATION ADMIN EACH ADD: CPT | Performed by: FAMILY MEDICINE

## 2025-01-13 PROCEDURE — 99396 PREV VISIT EST AGE 40-64: CPT | Performed by: FAMILY MEDICINE

## 2025-01-13 PROCEDURE — 90471 IMMUNIZATION ADMIN: CPT | Performed by: FAMILY MEDICINE

## 2025-01-13 PROCEDURE — 90656 IIV3 VACC NO PRSV 0.5 ML IM: CPT | Performed by: FAMILY MEDICINE

## 2025-01-13 NOTE — ASSESSMENT & PLAN NOTE
Last Pap Smear: In September 2023 by OB/GYN per patient and normal  Mammogram: Scheduled 2/24/25  Colonoscopy: 2021 at [facility] and normal per patient due again 2031

## 2025-01-13 NOTE — ASSESSMENT & PLAN NOTE
Last Pap Smear: 1/27/2020 last pap smear wnl and negative HPV due again this year follows with ob/gyn  Mammogram: Mammogram scheduled for February 02/24/24  Colonoscopy: 2021 at [facility] normal due again 2031

## 2025-01-13 NOTE — PROGRESS NOTES
Adult Annual Physical  Name: Jesi Du      : 1975      MRN: [id number]  Encounter Provider: Rebecca Fay Kab-Perlman, MD  Encounter Date: 2025   Encounter department: [facility]    Assessment & Plan  Annual physical exam         Healthcare maintenance  Last Pap Smear: In 2023 by OB/GYN per patient and normal  Mammogram: Scheduled 25  Colonoscopy:  at [facility] and normal per patient due again          Immunizations and preventive care screenings were discussed with patient today. Appropriate education was printed on patient's after visit summary.    Counseling:  {Annual Physical; Counselin}         History of Present Illness   {?Quick Links Encounters * My Last Note * Last Note in Specialty * Snapshot * Since Last Visit * History :09108}  Adult Annual Physical  Review of Systems  {Select to Display PMH (Optional):14950}    Objective {?Quick Links Trend Vitals * Enter New Vitals * Results Review * Timeline (Adult) * Labs * Imaging * Cardiology * Procedures * Lung Cancer Screening * Surgical eConsent :13351}  There were no vitals taken for this visit.    Physical Exam  {Administrative / Billing Section (Optional):58490}

## 2025-01-13 NOTE — PROGRESS NOTES
Adult Annual Physical  Name: Jesi Du      : 1975      MRN: [id number]  Encounter Provider: Rebecca Fay Kab-Perlman, MD  Encounter Date: 2025   Encounter department: [facility]    Assessment & Plan  Annual physical exam  -Completed annual today, ordered lipid panel  -Interpretor Used ID # [id number]  -Reviewed labwork from 2024 today   -Age of first menstrual cycle age 15/15; still gets regular menstrual cycles    Orders:    Lipid Panel with Direct LDL reflex; Future    Healthcare maintenance  Last Pap Smear: 2020 last pap smear wnl and negative HPV due again this year follows with ob/gyn  Mammogram: Mammogram scheduled for 24  Colonoscopy:  at [facility] normal due again           Encounter for immunization  -Tdap and influenza administered today     Orders:    Tdap Vaccine greater than or equal to 8yo    influenza vaccine preservative-free 0.5 mL IM (Fluzone, Afluria, Fluarix, Flulaval)    Family history of cataracts  -states in [place] was diagnosed with cataracts  -needs optometry appointment    PLAN  -encouraged optometry appointment and placed ophthalmology appointment in case will need it    Orders:    Ambulatory Referral to Ophthalmology; Future    Immunizations and preventive care screenings were discussed with patient today. Appropriate education was printed on patient's after visit summary.    Counseling:  Alcohol/drug use: discussed moderation in alcohol intake, the recommendations for healthy alcohol use, and avoidance of illicit drug use.  Dental Health: discussed importance of regular tooth brushing, flossing, and dental visits.  Injury prevention: discussed safety/seat belts, safety helmets, smoke detectors, carbon monoxide detectors, and smoking near bedding or upholstery.  Sexual health: discussed sexually transmitted diseases, partner selection, use of condoms, avoidance of unintended pregnancy, and contraceptive  alternatives.  Exercise: the importance of regular exercise/physical activity was discussed. Recommend exercise 3-5 times per week for at least 30 minutes.       Depression Screening and Follow-up Plan: Patient was screened for depression during today's encounter. They screened negative with a PHQ-2 score of 0.        History of Present Illness     Adult Annual Physical:  Patient presents for annual physical.     Diet and Physical Activity:  - Diet/Nutrition: well balanced diet.  - Exercise: no formal exercise.    Depression Screening:  - PHQ-2 Score: 0    General Health:  - Sleep: sleeps well.  - Hearing: normal hearing right ear and normal hearing left ear.  - Vision: no vision problems.  - Dental: regular dental visits.    /GYN Health:  - Follows with GYN: yes.   - Menopause: perimenopausal.   - Last menstrual cycle: 12/30/2024.   - History of STDs: no  - Contraception:. tubal ligation      Advanced Care Planning:  - Has an advanced directive?: no    - Has a durable medical POA?: no    - ACP document given to patient?: yes      Review of Systems   Constitutional:  Negative for fever.   Cardiovascular:  Negative for chest pain and palpitations.   Gastrointestinal:  Negative for abdominal pain, constipation and diarrhea.   Skin:  Negative for rash.   Hematological:  Does not bruise/bleed easily.     Pertinent Medical History   Herpes    Medical History Reviewed by provider this encounter:  Tobacco  Allergies  Meds  Med Hx  Surg Hx  Fam Hx  Soc Hx    .  Past Medical History   Past Medical History:   Diagnosis Date    Depression 2017    well-managed with Zoloft    Varicella      Past Surgical History:   Procedure Laterality Date    TUBAL LIGATION  2009     Family History   Problem Relation Age of Onset    Hypertension Mother     No Known Problems Father     No Known Problems Daughter     Cancer Maternal Grandmother     No Known Problems Maternal Grandfather     No Known Problems Paternal Grandmother     No  "Known Problems Paternal Grandfather     No Known Problems Paternal Aunt     No Known Problems Paternal Aunt     No Known Problems Paternal Aunt     No Known Problems Paternal Aunt     No Known Problems Paternal Aunt     No Known Problems Paternal Aunt     No Known Problems Paternal Aunt     No Known Problems Paternal Aunt     No Known Problems Paternal Aunt     No Known Problems Paternal Aunt     Breast cancer Neg Hx       reports that she has never smoked. She has never been exposed to tobacco smoke. She has never used smokeless tobacco. She reports that she does not drink alcohol and does not use drugs.  Current Outpatient Medications on File Prior to Visit   Medication Sig Dispense Refill    Melatonin 10 MG TABS Take 10 mg by mouth daily at bedtime      omeprazole (PriLOSEC) 20 mg delayed release capsule Take 1 capsule (20 mg total) by mouth daily before breakfast 90 capsule 3     No current facility-administered medications on file prior to visit.   No Known Allergies   Current Outpatient Medications on File Prior to Visit   Medication Sig Dispense Refill    Melatonin 10 MG TABS Take 10 mg by mouth daily at bedtime      omeprazole (PriLOSEC) 20 mg delayed release capsule Take 1 capsule (20 mg total) by mouth daily before breakfast 90 capsule 3     No current facility-administered medications on file prior to visit.      Social History     Tobacco Use    Smoking status: Never     Passive exposure: Never    Smokeless tobacco: Never   Vaping Use    Vaping status: Never Used   Substance and Sexual Activity    Alcohol use: No    Drug use: Never    Sexual activity: Yes     Partners: Male     Birth control/protection: Female Sterilization       Objective   /70 (BP Location: Left arm, Patient Position: Sitting, Cuff Size: Standard)   Pulse 84   Temp 97.5 °F (36.4 °C) (Temporal)   Resp 14   Ht 5' 6\" (1.676 m)   Wt 69.8 kg (153 lb 12.8 oz)   SpO2 100%   BMI 24.82 kg/m²     Physical Exam  Constitutional:       " Appearance: Normal appearance. She is normal weight.   HENT:      Head: Normocephalic and atraumatic.      Right Ear: Tympanic membrane, ear canal and external ear normal.      Left Ear: Tympanic membrane, ear canal and external ear normal.      Nose: Nose normal.      Mouth/Throat:      Mouth: Mucous membranes are moist.      Pharynx: Oropharynx is clear.   Eyes:      Extraocular Movements: Extraocular movements intact.      Conjunctiva/sclera: Conjunctivae normal.      Pupils: Pupils are equal, round, and reactive to light.   Cardiovascular:      Rate and Rhythm: Normal rate and regular rhythm.      Heart sounds: Normal heart sounds. No murmur heard.     No friction rub. No gallop.   Pulmonary:      Effort: Pulmonary effort is normal.      Breath sounds: Normal breath sounds. No wheezing, rhonchi or rales.   Abdominal:      General: Abdomen is flat. Bowel sounds are normal. There is no distension.      Palpations: Abdomen is soft. There is no mass.      Tenderness: There is no abdominal tenderness.      Hernia: No hernia is present.   Musculoskeletal:         General: Normal range of motion.      Cervical back: Normal range of motion and neck supple. No rigidity or tenderness.   Lymphadenopathy:      Cervical: No cervical adenopathy.   Skin:     General: Skin is warm.   Neurological:      General: No focal deficit present.      Mental Status: She is alert and oriented to person, place, and time.      Cranial Nerves: No cranial nerve deficit.      Sensory: No sensory deficit.      Motor: No weakness (muscle strength 5+ in upper and lower extremities).      Gait: Gait normal.   Psychiatric:         Mood and Affect: Mood normal.         Behavior: Behavior normal.

## 2025-01-13 NOTE — PATIENT INSTRUCTIONS
"Patient Education     Routine physical for adults   The Basics   Written by the doctors and editors at [facility]   What is a physical? -- A physical is a routine visit, or \"check-up,\" with your doctor. You might also hear it called a \"wellness visit\" or \"preventive visit.\"  During each visit, the doctor will:   Ask about your physical and mental health   Ask about your habits, behaviors, and lifestyle   Do an exam   Give you vaccines if needed   Talk to you about any medicines you take   Give advice about your health   Answer your questions  Getting regular check-ups is an important part of taking care of your health. It can help your doctor find and treat any problems you have. But it's also important for preventing health problems.  A routine physical is different from a \"sick visit.\" A sick visit is when you see a doctor because of a health concern or problem. Since physicals are scheduled ahead of time, you can think about what you want to ask the doctor.  How often should I get a physical? -- It depends on your age and health. In general, for people age 21 years and older:   If you are younger than 50 years, you might be able to get a physical every 3 years.   If you are 50 years or older, your doctor might recommend a physical every year.  If you have an ongoing health condition, like diabetes or high blood pressure, your doctor will probably want to see you more often.  What happens during a physical? -- In general, each visit will include:   Physical exam - The doctor or nurse will check your height, weight, heart rate, and blood pressure. They will also look at your eyes and ears. They will ask about how you are feeling and whether you have any symptoms that bother you.   Medicines - It's a good idea to bring a list of all the medicines you take to each doctor visit. Your doctor will talk to you about your medicines and answer any questions. Tell them if you are having any side effects that bother you. You " "should also tell them if you are having trouble paying for any of your medicines.   Habits and behaviors - This includes:   Your diet   Your exercise habits   Whether you smoke, drink alcohol, or use drugs   Whether you are sexually active   Whether you feel safe at home  Your doctor will talk to you about things you can do to improve your health and lower your risk of health problems. They will also offer help and support. For example, if you want to quit smoking, they can give you advice and might prescribe medicines. If you want to improve your diet or get more physical activity, they can help you with this, too.   Lab tests, if needed - The tests you get will depend on your age and situation. For example, your doctor might want to check your:   Cholesterol   Blood sugar   Iron level   Vaccines - The recommended vaccines will depend on your age, health, and what vaccines you already had. Vaccines are very important because they can prevent certain serious or deadly infections.   Discussion of screening - \"Screening\" means checking for diseases or other health problems before they cause symptoms. Your doctor can recommend screening based on your age, risk, and preferences. This might include tests to check for:   Cancer, such as breast, prostate, cervical, ovarian, colorectal, prostate, lung, or skin cancer   Sexually transmitted infections, such as chlamydia and gonorrhea   Mental health conditions like depression and anxiety  Your doctor will talk to you about the different types of screening tests. They can help you decide which screenings to have. They can also explain what the results might mean.   Answering questions - The physical is a good time to ask the doctor or nurse questions about your health. If needed, they can refer you to other doctors or specialists, too.  Adults older than 65 years often need other care, too. As you get older, your doctor will talk to you about:   How to prevent falling at " home   Hearing or vision tests   Memory testing   How to take your medicines safely   Making sure that you have the help and support you need at home  All topics are updated as new evidence becomes available and our peer review process is complete.  This topic retrieved from [employer] on: May 02, 2024.  Topic 770672 Version 1.0  Release: 32.4.3 - C32.122  © 2024 UpToDate, Inc. and/or its affiliates. All rights reserved.  Consumer Information Use and Disclaimer   Disclaimer: This generalized information is a limited summary of diagnosis, treatment, and/or medication information. It is not meant to be comprehensive and should be used as a tool to help the user understand and/or assess potential diagnostic and treatment options. It does NOT include all information about conditions, treatments, medications, side effects, or risks that may apply to a specific patient. It is not intended to be medical advice or a substitute for the medical advice, diagnosis, or treatment of a health care provider based on the health care provider's examination and assessment of a patient's specific and unique circumstances. Patients must speak with a health care provider for complete information about their health, medical questions, and treatment options, including any risks or benefits regarding use of medications. This information does not endorse any treatments or medications as safe, effective, or approved for treating a specific patient. UpToDate, Inc. and its affiliates disclaim any warranty or liability relating to this information or the use thereof.The use of this information is governed by the Terms of Use, available at https://www.woltersEnteroMedicsuwer.com/en/know/clinical-effectiveness-terms. 2024© UpToDate, Inc. and its affiliates and/or licensors. All rights reserved.  Copyright   © 2024 UpToDate, Inc. and/or its affiliates. All rights reserved.    Patient Education     Routine physical for adults   The Basics   Written by the  "doctors and editors at [facility]   What is a physical? -- A physical is a routine visit, or \"check-up,\" with your doctor. You might also hear it called a \"wellness visit\" or \"preventive visit.\"  During each visit, the doctor will:   Ask about your physical and mental health   Ask about your habits, behaviors, and lifestyle   Do an exam   Give you vaccines if needed   Talk to you about any medicines you take   Give advice about your health   Answer your questions  Getting regular check-ups is an important part of taking care of your health. It can help your doctor find and treat any problems you have. But it's also important for preventing health problems.  A routine physical is different from a \"sick visit.\" A sick visit is when you see a doctor because of a health concern or problem. Since physicals are scheduled ahead of time, you can think about what you want to ask the doctor.  How often should I get a physical? -- It depends on your age and health. In general, for people age 21 years and older:   If you are younger than 50 years, you might be able to get a physical every 3 years.   If you are 50 years or older, your doctor might recommend a physical every year.  If you have an ongoing health condition, like diabetes or high blood pressure, your doctor will probably want to see you more often.  What happens during a physical? -- In general, each visit will include:   Physical exam - The doctor or nurse will check your height, weight, heart rate, and blood pressure. They will also look at your eyes and ears. They will ask about how you are feeling and whether you have any symptoms that bother you.   Medicines - It's a good idea to bring a list of all the medicines you take to each doctor visit. Your doctor will talk to you about your medicines and answer any questions. Tell them if you are having any side effects that bother you. You should also tell them if you are having trouble paying for any of your " "medicines.   Habits and behaviors - This includes:   Your diet   Your exercise habits   Whether you smoke, drink alcohol, or use drugs   Whether you are sexually active   Whether you feel safe at home  Your doctor will talk to you about things you can do to improve your health and lower your risk of health problems. They will also offer help and support. For example, if you want to quit smoking, they can give you advice and might prescribe medicines. If you want to improve your diet or get more physical activity, they can help you with this, too.   Lab tests, if needed - The tests you get will depend on your age and situation. For example, your doctor might want to check your:   Cholesterol   Blood sugar   Iron level   Vaccines - The recommended vaccines will depend on your age, health, and what vaccines you already had. Vaccines are very important because they can prevent certain serious or deadly infections.   Discussion of screening - \"Screening\" means checking for diseases or other health problems before they cause symptoms. Your doctor can recommend screening based on your age, risk, and preferences. This might include tests to check for:   Cancer, such as breast, prostate, cervical, ovarian, colorectal, prostate, lung, or skin cancer   Sexually transmitted infections, such as chlamydia and gonorrhea   Mental health conditions like depression and anxiety  Your doctor will talk to you about the different types of screening tests. They can help you decide which screenings to have. They can also explain what the results might mean.   Answering questions - The physical is a good time to ask the doctor or nurse questions about your health. If needed, they can refer you to other doctors or specialists, too.  Adults older than 65 years often need other care, too. As you get older, your doctor will talk to you about:   How to prevent falling at home   Hearing or vision tests   Memory testing   How to take your " medicines safely   Making sure that you have the help and support you need at home  All topics are updated as new evidence becomes available and our peer review process is complete.  This topic retrieved from [employer] on: May 02, 2024.  Topic 664878 Version 1.0  Release: 32.4.3 - C32.122  © 2024 UpToDate, Inc. and/or its affiliates. All rights reserved.  Consumer Information Use and Disclaimer   Disclaimer: This generalized information is a limited summary of diagnosis, treatment, and/or medication information. It is not meant to be comprehensive and should be used as a tool to help the user understand and/or assess potential diagnostic and treatment options. It does NOT include all information about conditions, treatments, medications, side effects, or risks that may apply to a specific patient. It is not intended to be medical advice or a substitute for the medical advice, diagnosis, or treatment of a health care provider based on the health care provider's examination and assessment of a patient's specific and unique circumstances. Patients must speak with a health care provider for complete information about their health, medical questions, and treatment options, including any risks or benefits regarding use of medications. This information does not endorse any treatments or medications as safe, effective, or approved for treating a specific patient. UpToDate, Inc. and its affiliates disclaim any warranty or liability relating to this information or the use thereof.The use of this information is governed by the Terms of Use, available at https://www.woltersBaokuuwer.com/en/know/clinical-effectiveness-terms. 2024© UpToDate, Inc. and its affiliates and/or licensors. All rights reserved.  Copyright   © 2024 UpToDate, Inc. and/or its affiliates. All rights reserved.

## 2025-01-29 ENCOUNTER — APPOINTMENT (OUTPATIENT)
Dept: LAB | Facility: HOSPITAL | Age: 50
End: 2025-01-29
Payer: COMMERCIAL

## 2025-01-29 DIAGNOSIS — Z00.00 ANNUAL PHYSICAL EXAM: ICD-10-CM

## 2025-01-29 LAB
CHOLEST SERPL-MCNC: 167 MG/DL (ref ?–200)
HDLC SERPL-MCNC: 70 MG/DL
LDLC SERPL CALC-MCNC: 74 MG/DL (ref 0–100)
TRIGL SERPL-MCNC: 117 MG/DL (ref ?–150)

## 2025-01-29 PROCEDURE — 36415 COLL VENOUS BLD VENIPUNCTURE: CPT

## 2025-01-29 PROCEDURE — 80061 LIPID PANEL: CPT

## 2025-02-02 ENCOUNTER — RESULTS FOLLOW-UP (OUTPATIENT)
Dept: OTHER | Facility: HOSPITAL | Age: 50
End: 2025-02-02

## 2025-02-11 PROBLEM — Z00.00 HEALTHCARE MAINTENANCE: Status: RESOLVED | Noted: 2024-01-12 | Resolved: 2025-02-11

## 2025-02-24 ENCOUNTER — HOSPITAL ENCOUNTER (OUTPATIENT)
Dept: MAMMOGRAPHY | Facility: CLINIC | Age: 50
Discharge: HOME | End: 2025-02-24
Payer: COMMERCIAL

## 2025-02-24 VITALS — HEIGHT: 66 IN | WEIGHT: 153 LBS | BODY MASS INDEX: 24.59 KG/M2

## 2025-02-24 DIAGNOSIS — Z12.31 ENCOUNTER FOR SCREENING MAMMOGRAM FOR MALIGNANT NEOPLASM OF BREAST: ICD-10-CM

## 2025-02-24 PROCEDURE — 77063 BREAST TOMOSYNTHESIS BI: CPT

## 2025-02-24 PROCEDURE — 77067 SCR MAMMO BI INCL CAD: CPT

## 2025-03-20 ENCOUNTER — OFFICE VISIT (OUTPATIENT)
Dept: FAMILY MEDICINE CLINIC | Facility: CLINIC | Age: 50
End: 2025-03-20

## 2025-03-20 ENCOUNTER — APPOINTMENT (OUTPATIENT)
Dept: LAB | Facility: HOSPITAL | Age: 50
End: 2025-03-20
Payer: COMMERCIAL

## 2025-03-20 VITALS
OXYGEN SATURATION: 100 % | DIASTOLIC BLOOD PRESSURE: 70 MMHG | RESPIRATION RATE: 16 BRPM | HEIGHT: 66 IN | WEIGHT: 157.8 LBS | BODY MASS INDEX: 25.36 KG/M2 | SYSTOLIC BLOOD PRESSURE: 120 MMHG | HEART RATE: 92 BPM | TEMPERATURE: 97.6 F

## 2025-03-20 DIAGNOSIS — R53.83 OTHER FATIGUE: ICD-10-CM

## 2025-03-20 DIAGNOSIS — E66.3 OVERWEIGHT (BMI 25.0-29.9): Primary | ICD-10-CM

## 2025-03-20 DIAGNOSIS — E66.3 OVERWEIGHT (BMI 25.0-29.9): ICD-10-CM

## 2025-03-20 LAB
ANION GAP SERPL CALCULATED.3IONS-SCNC: 9 MMOL/L (ref 4–13)
BUN SERPL-MCNC: 17 MG/DL (ref 5–25)
CALCIUM SERPL-MCNC: 9.5 MG/DL (ref 8.4–10.2)
CHLORIDE SERPL-SCNC: 103 MMOL/L (ref 96–108)
CO2 SERPL-SCNC: 27 MMOL/L (ref 21–32)
CREAT SERPL-MCNC: 0.72 MG/DL (ref 0.6–1.3)
FERRITIN SERPL-MCNC: 12 NG/ML (ref 11–307)
GFR SERPL CREATININE-BSD FRML MDRD: 99 ML/MIN/1.73SQ M
GLUCOSE SERPL-MCNC: 90 MG/DL (ref 65–140)
IRON SATN MFR SERPL: 20 % (ref 15–50)
IRON SERPL-MCNC: 72 UG/DL (ref 50–212)
POTASSIUM SERPL-SCNC: 4.2 MMOL/L (ref 3.5–5.3)
SODIUM SERPL-SCNC: 139 MMOL/L (ref 135–147)
TIBC SERPL-MCNC: 357 UG/DL (ref 250–450)
TRANSFERRIN SERPL-MCNC: 255 MG/DL (ref 203–362)
TSH SERPL DL<=0.05 MIU/L-ACNC: 1.98 UIU/ML (ref 0.45–4.5)
UIBC SERPL-MCNC: 285 UG/DL (ref 155–355)
VIT B12 SERPL-MCNC: 444 PG/ML (ref 180–914)

## 2025-03-20 PROCEDURE — 83540 ASSAY OF IRON: CPT

## 2025-03-20 PROCEDURE — 99213 OFFICE O/P EST LOW 20 MIN: CPT | Performed by: INTERNAL MEDICINE

## 2025-03-20 PROCEDURE — 36415 COLL VENOUS BLD VENIPUNCTURE: CPT

## 2025-03-20 PROCEDURE — 80048 BASIC METABOLIC PNL TOTAL CA: CPT

## 2025-03-20 PROCEDURE — 82728 ASSAY OF FERRITIN: CPT

## 2025-03-20 PROCEDURE — 83550 IRON BINDING TEST: CPT

## 2025-03-20 PROCEDURE — 84443 ASSAY THYROID STIM HORMONE: CPT

## 2025-03-20 PROCEDURE — 82607 VITAMIN B-12: CPT

## 2025-03-20 NOTE — ASSESSMENT & PLAN NOTE
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   -Has been dieting for 5 years per patient without tangible results  -No chronic medical conditions  -Very concerned about long term effects of being overweight and adamant about losing weight now with the help of a medication for the next 3-4 months    PLAN  -Explained with her BMI I don't think ozempic will be covered for her but we can consider something else  -Explained to her that while medications may be helpful they have side effects and that if she doesn't also change her lifestyle and perform regular exercise she can easily gain the weight back after using the medication for 3-4 months; she states she will exercise and use the medication together  -We will perform the labs as below to rule out other causes for her weight and if normal will reach out to her about which medication we can start and  on side effects.     Orders:    TSH, 3rd generation with Free T4 reflex; Future    Basic metabolic panel; Future

## 2025-03-20 NOTE — PROGRESS NOTES
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   -Has been dieting for 5 years per patient without tangible results  -No chronic medical conditions  -Very concerned about long term effects of being overweight and adamant about losing weight now with the help of a medication for the next 3-4 months    PLAN  -Explained with her BMI I don't think ozempic will be covered for her but we can consider something else  -Explained to her that while medications may be helpful they have side effects and that if she doesn't also change her lifestyle and perform regular exercise she can easily gain the weight back after using the medication for 3-4 months; she states she will exercise and use the medication together  -We will perform the labs as below to rule out other causes for her weight and if normal will reach out to her about which medication we can start and  on side effects.     Orders:    TSH, 3rd generation with Free T4 reflex; Future    Basic metabolic panel; Future    Other fatigue  -For the past 5 months, attributes to weight    PLAN  -Labs as below    Orders:    Vitamin B12; Future    Iron Panel (Includes Ferritin, Iron Sat%, Iron, and TIBC); Future           History of Present Illness     Jesi Du is a 50 yo female patient presenting today to discuss weight loss. Today she states she tries to eat salads daily and has not been losing weight. She works in [employer] and that is her daily exercise. She has been trying to lose weight for the past 5 years with diet alone. She is interested in oral medications.  "Has been feeling fatigued for the past 5 months. Thinks the problem is the weight.     Menstruates every month, regular cycle, bleeds for 5 days.    Review of Systems   Constitutional:  Positive for fatigue. Negative for fever.   Cardiovascular:  Negative for chest pain, palpitations and leg swelling.   Gastrointestinal:  Negative for abdominal pain.   Skin:  Negative for rash.   Neurological:  Negative for dizziness, light-headedness and headaches.   Hematological:  Negative for adenopathy. Does not bruise/bleed easily.       Objective   /70 (BP Location: Right arm, Patient Position: Sitting, Cuff Size: Standard)   Pulse 92   Temp 97.6 °F (36.4 °C) (Temporal)   Resp 16   Ht 5' 6\" (1.676 m)   Wt 71.6 kg (157 lb 12.8 oz)   LMP 02/20/2025   SpO2 100%   BMI 25.47 kg/m²      Physical Exam  Constitutional:       Appearance: Normal appearance.   HENT:      Head: Normocephalic and atraumatic.   Eyes:      Conjunctiva/sclera: Conjunctivae normal.      Comments: Mild pallor under bilateral eyelids   Neck:      Comments: No goiter  Cardiovascular:      Rate and Rhythm: Normal rate and regular rhythm.      Heart sounds: Normal heart sounds. No murmur heard.     No friction rub. No gallop.   Pulmonary:      Effort: Pulmonary effort is normal.      Breath sounds: Normal breath sounds. No wheezing, rhonchi or rales.   Abdominal:      General: Abdomen is flat. Bowel sounds are normal. There is no distension.      Palpations: Abdomen is soft. There is no mass.      Tenderness: There is no abdominal tenderness.      Hernia: No hernia is present.   Musculoskeletal:         General: Normal range of motion.      Cervical back: Normal range of motion and neck supple. No rigidity or tenderness.   Lymphadenopathy:      Cervical: No cervical adenopathy.   Skin:     General: Skin is warm.   Neurological:      General: No focal deficit present.      Mental Status: She is alert and oriented to person, place, and time. "   Psychiatric:         Mood and Affect: Mood normal.      Comments: Fixated on starting a weight loss medication

## 2025-03-21 ENCOUNTER — TELEPHONE (OUTPATIENT)
Dept: FAMILY MEDICINE CLINIC | Facility: CLINIC | Age: 50
End: 2025-03-21

## 2025-03-21 NOTE — TELEPHONE ENCOUNTER
Patient call stating that she is waiting for lab review in order to receive a weight loss medication.     Please advise.

## 2025-03-21 NOTE — TELEPHONE ENCOUNTER
PATIENT CALLED AND WANTED TO KNOW HER LAB RESULTS. I ADVISED PATIENT THE PROVIDER HAS TO LOOK AT THEM FIRST AND SOMEONE WILL CONTACT HER.

## 2025-03-27 ENCOUNTER — TELEMEDICINE (OUTPATIENT)
Dept: FAMILY MEDICINE CLINIC | Facility: CLINIC | Age: 50
End: 2025-03-27

## 2025-03-27 DIAGNOSIS — E66.3 OVERWEIGHT (BMI 25.0-29.9): Primary | ICD-10-CM

## 2025-03-27 PROCEDURE — NC001 PR NO CHARGE

## 2025-03-27 NOTE — TELEPHONE ENCOUNTER
Good Morning, patient states the labs results are ready, if you could send the medications to the pharmacy. Thanks.

## 2025-03-28 NOTE — TELEPHONE ENCOUNTER
Patient keeps calling regarding the weight loss medication. She would like to know why th doctor refused it. Patient would like a call back      Please advise.

## 2025-03-28 NOTE — ASSESSMENT & PLAN NOTE
-Prescribed phentermine-topiramate today and counseled that without lifestyle changes including regular exercise it won't be effective. Counseled on risks of hypertension, elevated heart rate, suicidal thoughts, anxiety, depression, dry mouth and cognitive changes including attention changes and if any of theses were to happen to reach out for a same day appointment and if severe go to ED  -Explained the plan will be to take the lower dose for 14 days then increase to the higher dose for 14 days; if after 12 weeks a 3 percent loss in baseline body weight (71.6kg aka 157 lb 12.8oz) is not achieved we can increase the dose two more times every two weeks. If still not effective will need to titrate down to minimize risk of seizures and try something else  -She has no history of hypertension, CAD, or conditions such as diabetes that would increase either of the above  -Recent TSH normal, and recent BMP with normal electrolytes and kidney function; no hx of nephrolithiasis   -Repeat BMP  in one month and told patient to call to set that f/u to monitor creatinine and electrolytes (topirimate is a carbonic anhydrase inhibitor)   -All questions answered  -Explained that a prior authorization is needed and it may not get covered

## 2025-03-28 NOTE — TELEPHONE ENCOUNTER
Virtual Brief Visit  Name: Jesi Du      : 1975      MRN: [id number]  Encounter Provider: Rebecca Fay Kab-Perlman, MD  Encounter Date: 3/27/2025   Encounter department: [facility]  :  Assessment & Plan  Overweight (BMI 25.0-29.9)  -Prescribed phentermine-topiramate today and counseled that without lifestyle changes including regular exercise it won't be effective. Counseled on risks of hypertension, elevated heart rate, suicidal thoughts, anxiety, depression, dry mouth and cognitive changes including attention changes and if any of theses were to happen to reach out for a same day appointment and if severe go to ED  -Explained the plan will be to take the lower dose for 14 days then increase to the higher dose for 14 days; if after 12 weeks a 3 percent loss in baseline body weight (71.6kg aka 157 lb 12.8oz) is not achieved we can increase the dose two more times every two weeks. If still not effective will need to titrate down to minimize risk of seizures and try something else  -She has no history of hypertension, CAD, or conditions such as diabetes that would increase either of the above  -Recent TSH normal, and recent BMP with normal electrolytes and kidney function; no hx of nephrolithiasis   -Repeat BMP  in one month and told patient to call to set that f/u to monitor creatinine and electrolytes (topirimate is a carbonic anhydrase inhibitor)   -All questions answered  -Explained that a prior authorization is needed and it may not get covered            History of Present Illness     Requested we speak in English per patient   Jesi Du is a 50 yo female patient who is on this telephone visit with me to discuss medications for weight loss. She has been dieting for years and works on her feet 7 hours a day and has been unable to lose weight. She does not do any formal exercise. Recent labwork including TSH, B12, iron panel, B12, and lipid panel are  unremarkable.     Administrative Statements   Encounter provider Rebecca Fay Kab-Perlman, MD    The Patient is located at Home and in the following state in which I hold an active license [place].    The patient was identified by name and date of birth. Jesi Du was informed that this is a telemedicine visit and that the visit is being conducted through Telephone.  My office door was closed. No one else was in the room.  She acknowledged consent and understanding of privacy and security of the video platform. The patient has agreed to participate and understands they can discontinue the visit at any time.    I have spent a total time of 8 minutes in caring for this patient on the day of the visit/encounter including Diagnostic results, Prognosis, Risks and benefits of tx options, Instructions for management, Patient and family education, Importance of tx compliance, Risk factor reductions, Impressions, Counseling / Coordination of care, Documenting in the medical record, Reviewing/placing orders in the medical record (including tests, medications, and/or procedures), and Obtaining or reviewing history  , not including the time spent for establishing the audio/video connection.

## 2025-03-28 NOTE — TELEPHONE ENCOUNTER
Hi, this patient MRN: [id number] is calling since yesterday. It's for a medication that Dr Casas has to send for weigh loss. but the med was refused. she wants that medication to be send to the pharmacy, she will pay for it she states.

## 2025-04-01 ENCOUNTER — TELEPHONE (OUTPATIENT)
Dept: FAMILY MEDICINE CLINIC | Facility: CLINIC | Age: 50
End: 2025-04-01

## 2025-04-01 NOTE — PROGRESS NOTES
Virtual Brief Visit  Name: Jesi Du      : 1975      MRN: [id number]  Encounter Provider: Rebecca Fay Kab-Perlman, MD  Encounter Date: 3/27/2025   Encounter department: [facility]  :  Assessment & Plan  Overweight (BMI 25.0-29.9)  -Prescribed phentermine-topiramate today and counseled that without lifestyle changes including regular exercise it won't be effective. Counseled on risks of hypertension, elevated heart rate, suicidal thoughts, anxiety, depression, dry mouth and cognitive changes including attention changes and if any of theses were to happen to reach out for a same day appointment and if severe go to ED  -Explained the plan will be to take the lower dose for 14 days then increase to the higher dose for 14 days; if after 12 weeks a 3 percent loss in baseline body weight (71.6kg aka 157 lb 12.8oz) is not achieved we can increase the dose two more times every two weeks. If still not effective will need to titrate down to minimize risk of seizures and try something else  -She has no history of hypertension, CAD, or conditions such as diabetes that would increase either of the above  -Recent TSH normal, and recent BMP with normal electrolytes and kidney function; no hx of nephrolithiasis   -Repeat BMP  in one month and told patient to call to set that f/u to monitor creatinine and electrolytes (topirimate is a carbonic anhydrase inhibitor)   -All questions answered  -Explained that a prior authorization is needed and it may not get covered           History of Present Illness       Requested we speak in English per patient   Jesi Du is a 48 yo female patient who is on this telephone visit with me to discuss medications for weight loss. She has been dieting for years and works on her feet 7 hours a day and has been unable to lose weight. She does not do any formal exercise. Recent labwork including TSH, B12, iron panel, B12, and lipid panel are  unremarkable.     Administrative Statements   Encounter provider Rebecca Fay Kab-Perlman, MD    The Patient is located at Home and in the following state in which I hold an active license [place].    The patient was identified by name and date of birth. Jesi Du was informed that this is a telemedicine visit and that the visit is being conducted through Telephone.  My office door was closed. No one else was in the room.  She acknowledged consent and understanding of privacy and security of the video platform. The patient has agreed to participate and understands they can discontinue the visit at any time.    I have spent a total time of 7 minutes in caring for this patient on the day of the visit/encounter including Diagnostic results, Prognosis, Risks and benefits of tx options, Instructions for management, Patient and family education, Importance of tx compliance, Risk factor reductions, Impressions, Counseling / Coordination of care, Documenting in the medical record, Reviewing/placing orders in the medical record (including tests, medications, and/or procedures), Obtaining or reviewing history  , and Communicating with other healthcare professionals , not including the time spent for establishing the audio/video connection.

## 2025-04-01 NOTE — TELEPHONE ENCOUNTER
Spoke to Jesi yesterday on 3/31/25 and explained to her that after speaking with two attendings neither find it to be appropriate care to provide her with phentermine-topiramate with a BMI of 25.47. I explained criteria is at least a BMI of 27 and that insurance will not cover it. She said she will pay out of pocket and I again explained that without regular exercise starting medications is not a good or long term solution. I offered a nutrition consult to help manage her diet and daily exercise routine that we can meet more frequently regarding however she declined at this time. She expressed understanding. I also offered to her the option of seeking another opinion.

## 2025-04-03 NOTE — TELEPHONE ENCOUNTER
Today called patient and spoke with her in English per patient preference.     Jesi Du is a 48 yo female patient who has been dieting for years and works on her feet 7 hours a day and has been unable to lose weight. She does not do any formal exercise. Recent labwork including TSH, B12, iron panel, B12, and lipid panel are unremarkable.     -Today I prescribed phentermine-topiramate and counseled that without lifestyle changes including regular exercise it won't be effective. Counseled on risks of hypertension, elevated heart rate, suicidal thoughts, anxiety, depression, dry mouth and cognitive changes including attention changes and if any of theses were to happen to reach out for a same day appointment and if severe go to ED  -Explained the plan will be to take the lower dose for 14 days then increase to the higher dose for 14 days; if after 12 weeks a 3 percent loss in baseline body weight (71.6kg aka 157 lb 12.8oz) is not achieved we can increase the dose two more times every two weeks. If still not effective will need to titrate down to minimize risk of seizures and try something else  -I also explained to her that I will need to discuss this with an attending as I need supervisory approval for these regulated drugs and will get back to her on Tuesday next week when I am in again; I also explained that even if it is signed off on we would need a prior authorization and likely insurance won't cover it  -She has no history of hypertension, CAD, or conditions such as diabetes that would increase either of the above  -Recent TSH normal, and recent BMP with normal electrolytes and kidney function; no hx of nephrolithiasis   -If we start the medication she will need a repeat BMP in one month  -All questions answered

## 2025-04-03 NOTE — ADDENDUM NOTE
Addended by: KAB-PERLMAN, REBECCA F on: 4/3/2025 10:24 AM     Modules accepted: Level of Service

## 2025-04-14 ENCOUNTER — VBI (OUTPATIENT)
Dept: ADMINISTRATIVE | Facility: OTHER | Age: 50
End: 2025-04-14

## 2025-04-14 NOTE — TELEPHONE ENCOUNTER
04/14/25 10:27 AM     Chart reviewed for Pap Smear (HPV) aka Cervical Cancer Screening ; nothing is submitted to the patient's insurance at this time.     F[place]us [place] MA   [facility]

## 2025-05-09 ENCOUNTER — TELEMEDICINE (OUTPATIENT)
Dept: FAMILY MEDICINE CLINIC | Facility: CLINIC | Age: 50
End: 2025-05-09

## 2025-05-09 ENCOUNTER — TELEPHONE (OUTPATIENT)
Dept: FAMILY MEDICINE CLINIC | Facility: CLINIC | Age: 50
End: 2025-05-09

## 2025-05-09 DIAGNOSIS — E66.3 OVERWEIGHT (BMI 25.0-29.9): Primary | ICD-10-CM

## 2025-05-09 NOTE — TELEPHONE ENCOUNTER
Patient request medication for weight loss due to since last visit patient gained more weight.    I schedule her for a virtual diallo.  She ask for a call.patient don't know how to use my chart.   normal...

## 2025-05-09 NOTE — ASSESSMENT & PLAN NOTE
-Today states she weighed herself and was 159lbs; she wanted to ask if this would now qualify her for prescription weight drugs  -Previously we discussed that she is not a candidate for prescription weight management and emphasized dietary and lifestyle changes    PLAN  -Today checked her updated BMI with the new weight andd it is 25.7 and would not qualify; again emphasized dietary and lifestyle changes as the best long-term option for her

## 2025-05-09 NOTE — PROGRESS NOTES
Virtual Brief Visit  Name: Jesi Du      : 1975      MRN: [id number]  Encounter Provider: Rebecca Fay Kab-Perlman, MD  Encounter Date: 2025   Encounter department: [facility]  :  Assessment & Plan  Overweight (BMI 25.0-29.9)  -Today states she weighed herself and was 159lbs; she wanted to ask if this would now qualify her for prescription weight drugs  -Previously we discussed that she is not a candidate for prescription weight management and emphasized dietary and lifestyle changes    PLAN  -Today checked her updated BMI with the new weight andd it is 25.7 and would not qualify; again emphasized dietary and lifestyle changes as the best long-term option for her           History of Present Illness     Jesi Du is a 48 yo female patient presenting for today's telephone visit regarding       Administrative Statements   Encounter provider Rebecca Fay Kab-Perlman, MD    The Patient is located at Home and in the following state in which I hold an active license [place].    The patient was identified by name and date of birth. Jesi Du was informed that this is a telemedicine visit and that the visit is being conducted through Telephone.  My office door was closed. No one else was in the room.  She acknowledged consent and understanding of privacy and security of the video platform. The patient has agreed to participate and understands they can discontinue the visit at any time.    I have spent a total time of 5 minutes in caring for this patient on the day of the visit/encounter including Risks and benefits of tx options, Instructions for management, Patient and family education, Impressions, Documenting in the medical record, and Obtaining or reviewing history  , not including the time spent for establishing the audio/video connection.

## 2025-08-20 ENCOUNTER — OFFICE VISIT (OUTPATIENT)
Dept: FAMILY MEDICINE CLINIC | Facility: CLINIC | Age: 50
End: 2025-08-20

## 2025-08-20 VITALS
SYSTOLIC BLOOD PRESSURE: 116 MMHG | HEART RATE: 85 BPM | BODY MASS INDEX: 23.63 KG/M2 | WEIGHT: 147 LBS | TEMPERATURE: 98.7 F | RESPIRATION RATE: 18 BRPM | DIASTOLIC BLOOD PRESSURE: 80 MMHG | HEIGHT: 66 IN | OXYGEN SATURATION: 98 %

## 2025-08-20 DIAGNOSIS — B35.0 TINEA CAPITIS: Primary | ICD-10-CM

## 2025-08-20 PROCEDURE — 99213 OFFICE O/P EST LOW 20 MIN: CPT

## 2025-08-20 RX ORDER — PHENTERMINE HYDROCHLORIDE 37.5 MG/1
1 TABLET ORAL DAILY
COMMUNITY
Start: 2025-08-13

## 2025-08-20 RX ORDER — TERBINAFINE HYDROCHLORIDE 250 MG/1
250 TABLET ORAL DAILY
Qty: 28 TABLET | Refills: 0 | Status: SHIPPED | OUTPATIENT
Start: 2025-08-20 | End: 2025-09-17

## 2025-08-20 RX ORDER — KETOCONAZOLE 20 MG/ML
1 SHAMPOO, SUSPENSION TOPICAL 2 TIMES WEEKLY
Qty: 120 ML | Refills: 0 | Status: SHIPPED | OUTPATIENT
Start: 2025-08-21